# Patient Record
Sex: FEMALE | Race: BLACK OR AFRICAN AMERICAN | NOT HISPANIC OR LATINO | Employment: PART TIME | URBAN - METROPOLITAN AREA
[De-identification: names, ages, dates, MRNs, and addresses within clinical notes are randomized per-mention and may not be internally consistent; named-entity substitution may affect disease eponyms.]

---

## 2017-05-24 ENCOUNTER — HOSPITAL ENCOUNTER (EMERGENCY)
Facility: OTHER | Age: 37
Discharge: HOME | End: 2017-05-25
Attending: INTERNAL MEDICINE
Payer: MEDICAID

## 2017-05-24 DIAGNOSIS — R56.9 SEIZURES: Primary | ICD-10-CM

## 2017-05-24 LAB
B-HCG UR QL: NEGATIVE
CTP QC/QA: YES

## 2017-05-24 PROCEDURE — 25000003 PHARM REV CODE 250: Performed by: INTERNAL MEDICINE

## 2017-05-24 PROCEDURE — 81025 URINE PREGNANCY TEST: CPT | Performed by: INTERNAL MEDICINE

## 2017-05-24 PROCEDURE — 96374 THER/PROPH/DIAG INJ IV PUSH: CPT

## 2017-05-24 PROCEDURE — 85025 COMPLETE CBC W/AUTO DIFF WBC: CPT

## 2017-05-24 PROCEDURE — 80053 COMPREHEN METABOLIC PANEL: CPT

## 2017-05-24 PROCEDURE — 99284 EMERGENCY DEPT VISIT MOD MDM: CPT | Mod: 25

## 2017-05-24 PROCEDURE — 63600175 PHARM REV CODE 636 W HCPCS: Performed by: INTERNAL MEDICINE

## 2017-05-24 RX ADMIN — FOSPHENYTOIN SODIUM 825 MG PE: 50 INJECTION, SOLUTION INTRAMUSCULAR; INTRAVENOUS at 11:05

## 2017-05-25 VITALS
DIASTOLIC BLOOD PRESSURE: 78 MMHG | HEIGHT: 64 IN | WEIGHT: 182 LBS | OXYGEN SATURATION: 100 % | TEMPERATURE: 98 F | RESPIRATION RATE: 18 BRPM | SYSTOLIC BLOOD PRESSURE: 128 MMHG | BODY MASS INDEX: 31.07 KG/M2 | HEART RATE: 77 BPM

## 2017-05-25 LAB
ALBUMIN SERPL-MCNC: 3.2 G/DL (ref 3.3–5.5)
ALP SERPL-CCNC: 40 U/L (ref 42–141)
BILIRUB SERPL-MCNC: 0.4 MG/DL (ref 0.2–1.6)
BUN SERPL-MCNC: 10 MG/DL (ref 7–22)
CALCIUM SERPL-MCNC: 9.9 MG/DL (ref 8–10.3)
CHLORIDE SERPL-SCNC: 105 MMOL/L (ref 98–108)
CREAT SERPL-MCNC: 0.7 MG/DL (ref 0.6–1.2)
GLUCOSE SERPL-MCNC: 96 MG/DL (ref 73–118)
POC ALT (SGPT): 17 U/L (ref 10–47)
POC AST (SGOT): 20 U/L (ref 11–38)
POC TCO2: 26 MMOL/L (ref 18–33)
POTASSIUM BLD-SCNC: 3.9 MMOL/L (ref 3.6–5.1)
PROTEIN, POC: 7.2 G/DL (ref 6.4–8.1)
SODIUM BLD-SCNC: 133 MMOL/L (ref 128–145)

## 2017-05-25 RX ORDER — PHENYTOIN SODIUM 100 MG/1
300 CAPSULE, EXTENDED RELEASE ORAL DAILY
Qty: 100 CAPSULE | Refills: 0 | Status: SHIPPED | OUTPATIENT
Start: 2017-05-25 | End: 2018-05-25

## 2017-05-25 NOTE — ED NOTES
Patient identifiers verified and correct for Ashlee Flores.    LOC: The patient is awake, alert and aware of environment with an appropriate affect, the patient is oriented x 3 and speaking appropriately.  APPEARANCE: Patient resting comfortably and in no acute distress, patient is clean and well groomed, patient's clothing is properly fastened.  SKIN: The skin is warm and dry, color consistent with ethnicity, patient has normal skin turgor and moist mucus membranes, skin intact, no breakdown or bruising noted.  MUSCULOSKELETAL: Patient moving all extremities spontaneously, no obvious swelling or deformities noted.  RESPIRATORY: Airway is open and patent, respirations are spontaneous, patient has a normal effort and rate, no accessory muscle use noted, clear bilateral breath sounds.  CARDIAC: Patient has a normal rate and regular rhythm, no periphreal edema noted, capillary refill < 3 seconds.  ABDOMEN: Soft and non tender to palpation, no distention noted, normoactive bowel sounds present in all four quadrants.  NEUROLOGIC: PERRL, 3mm bilaterally, eyes open spontaneously, behavior appropriate to situation, follows commands, facial expression symmetrical, bilateral hand grasp equal and even, purposeful motor response noted, normal sensation in all extremities when touched with a finger.

## 2017-05-25 NOTE — ED PROVIDER NOTES
Encounter Date: 5/24/2017       History     Chief Complaint   Patient presents with    Seizures     Per pt's friend. Pt had seizure like activity lasting a few seconds approx 10 mins pta. Pt aaox 3 upon arrival.      Review of patient's allergies indicates:  No Known Allergies  37-year-old female presents to the emergency department after having a seizure at home today.  The seizure was witnessed by a family member who states it lasted about 10-20 seconds and consisted of generalized convulsions.  Patient states she has a history of a pituitary tumor which was resected in 2010.  She began to have seizures after the tumor grew, and after the tumor was resected her seizures discontinued and she was taken off her seizure medication, the name of which she doesn't recall      The history is provided by the patient. No  was used.   Neurologic Problem   The primary symptoms include seizures. Primary symptoms do not include headaches or dizziness. The symptoms began just prior to arrival. The episode lasted 20 seconds. The symptoms are resolved. The neurological symptoms are diffuse.   The onset of the seizure occurred less than 30 minutes ago. The seizure was witnessed. The event was not accompanied by urinary incontinence. Symptoms that were not associated with the seizure include headaches or myalgias. Medical problems do not include infection.   Additional symptoms do not include neck stiffness, weakness or photophobia. Medical issues also include seizures.     Past Medical History:   Diagnosis Date    Pituitary tumor      History reviewed. No pertinent surgical history.  History reviewed. No pertinent family history.  Social History   Substance Use Topics    Smoking status: Current Every Day Smoker    Smokeless tobacco: Not on file    Alcohol use Yes      Comment: social     Review of Systems   Constitutional: Negative.    HENT: Negative.    Eyes: Negative for photophobia.   Respiratory:  Negative.    Cardiovascular: Negative.    Gastrointestinal: Negative.    Endocrine: Negative.    Musculoskeletal: Negative for myalgias and neck stiffness.   Skin: Negative.    Neurological: Positive for seizures. Negative for dizziness, tremors, facial asymmetry, speech difficulty, weakness, light-headedness, numbness and headaches.   Psychiatric/Behavioral: Negative.        Physical Exam     Initial Vitals [05/24/17 2143]   BP Pulse Resp Temp SpO2   131/70 98 20 98.1 °F (36.7 °C) 96 %     Physical Exam    Nursing note and vitals reviewed.  Constitutional: She appears well-developed and well-nourished.   HENT:   Head: Normocephalic and atraumatic.   Right Ear: External ear normal.   Left Ear: External ear normal.   Nose: Nose normal.   Mouth/Throat: Oropharynx is clear and moist.   Eyes: Conjunctivae and EOM are normal. Pupils are equal, round, and reactive to light.   Neck: Normal range of motion. Neck supple.   Cardiovascular: Normal rate and regular rhythm.   Pulmonary/Chest: Breath sounds normal. No respiratory distress.   Abdominal: Soft. She exhibits no distension.   Musculoskeletal: Normal range of motion.   Neurological: She is alert and oriented to person, place, and time. No cranial nerve deficit.   Skin: Skin is warm and dry. Capillary refill takes less than 2 seconds.   Psychiatric: She has a normal mood and affect. Her behavior is normal. Thought content normal.         ED Course   Procedures  Labs Reviewed   POCT CMP - Abnormal; Notable for the following:        Result Value    Albumin, POC 3.2 (*)     Alkaline Phosphatase, POC 40 (*)     All other components within normal limits   POCT CBC   POCT URINE PREGNANCY   POCT CMP             Medical Decision Making:   Initial Assessment:   37-year-old female presents to the emergency department after having a seizure at home today.  The seizure was witnessed by a family member who states it lasted about 10-20 seconds and consisted of generalized  convulsions.  Patient states she has a history of a pituitary tumor which was resected in 2010.  She began to have seizures after the tumor grew, and after the tumor was resected her seizures discontinued and she was taken off her seizure medication, the name of which she doesn't recall    Differential Diagnosis:   Seizures  Syncopal episode  Clinical Tests:   Lab Tests: Ordered and Reviewed  Radiological Study: Ordered and Reviewed  ED Management:  Evaluation revealed no definite acute reason for recurrence of seizures.  CT of the head showed no acute changes.  Patient was advised to follow up with her neurologist tomorrow for further evaluation and treatment.  She was given an infusion of Cerebyx in the emergency department and a prescription for Dilantin.        Labs Reviewed  Admission on 05/24/2017, Discharged on 05/25/2017   Component Date Value Ref Range Status    POC Preg Test, Ur 05/24/2017 Negative  Negative Final     Acceptable 05/24/2017 Yes   Final    Albumin, POC 05/25/2017 3.2* 3.3 - 5.5 g/dL Final    Alkaline Phosphatase, POC 05/25/2017 40* 42 - 141 U/L Final    ALT (SGPT), POC 05/25/2017 17  10 - 47 U/L Final    AST (SGOT), POC 05/25/2017 20  11 - 38 U/L Final    POC BUN 05/25/2017 10  7 - 22 mg/dL Final    Calcium, POC 05/25/2017 9.9  8.0 - 10.3 mg/dL Final    POC Chloride 05/25/2017 105  98 - 108 mmol/L Final    POC Creatinine 05/25/2017 0.7  0.6 - 1.2 mg/dL Final    POC Glucose 05/25/2017 96  73 - 118 mg/dL Final    POC Potassium 05/25/2017 3.9  3.6 - 5.1 mmol/L Final    POC Sodium 05/25/2017 133  128 - 145 mmol/L Final    Bilirubin 05/25/2017 0.4  0.2 - 1.6 mg/dL Final    POC TCO2 05/25/2017 26  18 - 33 mmol/L Final    Protein 05/25/2017 7.2  6.4 - 8.1 g/dL Final        Imaging Reviewed    Imaging Results          CT Head Without Contrast (Final result)  Result time 05/24/17 23:42:40    Final result by Colt Yan DO (05/24/17 23:42:40)                  Narrative:    Study Desc:   CT HEAD WITHOUT CONTRAST  Clinical History: Seizure, history of pituitary tumor removal.  COMPARISON: None.     Technique: CT of the brain is performed with a multi-detector CT. Coronal and sagittal   reconstructions were obtained.     The total exam DLP is 646.25 mGy-cm.     FINDINGS:  The brain is structurally normal without focal lesion, edema, hemorrhage, mass, or mass   effect.  The ventricles are normal in size and configuration.  The basal cisterns are   normal.  Prominent CSF space in the sella which suggest partial empty sella but could be   postsurgical nature.  The anterior inferior sella turcica appears markedly thinned.    Comparison to prior imaging or follow-up imaging may have merit.  The skull, orbits, paranasal sinuses, and mastoid air cells are otherwise normal.     If symptoms or clinical suspicion persists or get worse or suspicion for acute or   subacute infarct, correlation with MRI with diffusion weighted sequence may have merit.     IMPRESSION:  Prominent CSF space in the sella which suggest partial empty sella but could be   postsurgical nature.  The anterior inferior sella turcica appears markedly thinned.    Comparison to prior imaging or follow-up imaging may have merit.  Normal noncontrast CT examination of the brain.  There is no stroke, hemorrhage, or mass.     SL:24  Signed by: JOHANA Yan DO  2017-05-24 23:42:38 [CDT]                              Medications given in ED    Medications   fosphenytoin (CEREBYX) 825 mg PE in dextrose 5 % 50 mL IVPB (825 mg PE Intravenous Given 5/24/17 2304)       Discharge Medications     Discharge Medication List as of 5/25/2017 12:07 AM      START taking these medications    Details   phenytoin (DILANTIN) 100 MG ER capsule Take 3 capsules (300 mg total) by mouth once daily., Starting Thu 5/25/2017, Until Fri 5/25/2018, Print                   Patient discharged to home in stable condition with instructions to:   1.  Please take all meds as prescribed.  2. Follow-up with your primary care doctor   3. Return precautions discussed and patient and/or family/caretaker understands to return to the emergency room for any concerns including worsening of your current symptoms, fever, chills, night sweats, worsening pain, chest pain, shortness of breath, nausea, vomiting, diarrhea, bleeding, headache, difficulty talking, visual disturbances, weakness, numbness or any other acute concerns                ED Course     Clinical Impression:   The primary diagnosis is seizure disorder  Disposition:   Disposition: Discharged  Condition: Stable       Nato Jones MD  05/25/17 0202

## 2017-05-25 NOTE — ED TRIAGE NOTES
Pt was standing outside smoking and passed out for like 10 secs. Per bystander pt went out again but was convulsing and came around after a few minutes. Pt states she had pituitary tumor diagnosed in 2010 with surgery in 2011 and has not had any seizures since. Pt aaox3 upon arrival. + loc. Pt has no other complaints

## 2017-06-21 ENCOUNTER — HOSPITAL ENCOUNTER (EMERGENCY)
Facility: OTHER | Age: 37
Discharge: HOME | End: 2017-06-21
Attending: EMERGENCY MEDICINE
Payer: MEDICAID

## 2017-06-21 VITALS
DIASTOLIC BLOOD PRESSURE: 83 MMHG | TEMPERATURE: 98 F | RESPIRATION RATE: 16 BRPM | HEART RATE: 82 BPM | SYSTOLIC BLOOD PRESSURE: 123 MMHG | OXYGEN SATURATION: 100 %

## 2017-06-21 DIAGNOSIS — M72.2 PLANTAR FASCIITIS: Primary | ICD-10-CM

## 2017-06-21 DIAGNOSIS — M79.671 RIGHT FOOT PAIN: ICD-10-CM

## 2017-06-21 PROCEDURE — 99283 EMERGENCY DEPT VISIT LOW MDM: CPT

## 2017-06-21 PROCEDURE — 25000003 PHARM REV CODE 250: Performed by: EMERGENCY MEDICINE

## 2017-06-21 RX ORDER — IBUPROFEN 400 MG/1
800 TABLET ORAL
Status: COMPLETED | OUTPATIENT
Start: 2017-06-21 | End: 2017-06-21

## 2017-06-21 RX ORDER — INDOMETHACIN 25 MG/1
50 CAPSULE ORAL
Qty: 30 CAPSULE | Refills: 0 | Status: SHIPPED | OUTPATIENT
Start: 2017-06-21

## 2017-06-21 RX ADMIN — IBUPROFEN 800 MG: 400 TABLET, FILM COATED ORAL at 08:06

## 2017-06-22 NOTE — ED PROVIDER NOTES
Encounter Date: 6/21/2017       History     Chief Complaint   Patient presents with    Foot Pain     pt c/o R foot pain x 3 days     37-year-old who complains of pain to the bottom of her foot.  Patient awoke 3 days ago with the pain.  She reports  throbbing pain when she walks or when she holds her foot down.    She did wear high heels 5  days ago.  She denies trauma.  She has not taken anything for the pain.            Review of patient's allergies indicates:  No Known Allergies  Past Medical History:   Diagnosis Date    Pituitary tumor     Seizures      Past Surgical History:   Procedure Laterality Date    TUMOR REMOVAL       History reviewed. No pertinent family history.  Social History   Substance Use Topics    Smoking status: Current Every Day Smoker    Smokeless tobacco: Not on file    Alcohol use Yes      Comment: social     Review of Systems   Constitutional: Negative for fever.   Musculoskeletal: Positive for gait problem.        Right foot pain   Skin: Negative for color change.   Neurological: Negative for weakness and numbness.       Physical Exam     Initial Vitals [06/21/17 1924]   BP Pulse Resp Temp SpO2   -- -- -- 98.2 °F (36.8 °C) --     Physical Exam    Constitutional: No distress.   Cardiovascular:   Pulses:       Dorsalis pedis pulses are 3+ on the right side.   Musculoskeletal: She exhibits no edema.   Mild tenderness only with deep palpation of the plantar surface of the right foot, medial aspect   Neurological: She is alert and oriented to person, place, and time. She has normal strength. No sensory deficit.   Skin: No erythema.         ED Course   Procedures  Labs Reviewed - No data to display          Medical Decision Making:   Initial Assessment:   37 yr old complains of pain to the plantar surface of her right foot.  No trauma.  Patient has no tenderness except with deep palpation  ED Management:  X-ray will be done to assess for calcaneal spur.  Patient will be treated with  ibuprofen.  There are no acute findings on x-ray.  Patient will be referred to podiatry and started on indomethacin.                   ED Course     Clinical Impression:   The primary encounter diagnosis was Plantar fasciitis. A diagnosis of Right foot pain was also pertinent to this visit.                           Jessy Rosenbaum MD  06/21/17 2365

## 2017-06-22 NOTE — ED NOTES
D/c'd with NADN: no complaints voiced; denies any needs; d/c education performed; pt stated understanding; steady gait OOED

## 2018-12-29 ENCOUNTER — HOSPITAL ENCOUNTER (INPATIENT)
Facility: OTHER | Age: 38
LOS: 4 days | Discharge: HOME | DRG: 863 | End: 2019-01-02
Attending: EMERGENCY MEDICINE | Admitting: SPECIALIST
Payer: MEDICAID

## 2018-12-29 DIAGNOSIS — R19.07 GENERALIZED SWELLING, MASS, OR LUMP OF ABDOMEN OR PELVIS: ICD-10-CM

## 2018-12-29 DIAGNOSIS — D64.9 ANEMIA, UNSPECIFIED TYPE: ICD-10-CM

## 2018-12-29 DIAGNOSIS — M60.009 MUSCLE ABSCESS: Primary | ICD-10-CM

## 2018-12-29 LAB
ALBUMIN SERPL BCP-MCNC: 2.5 G/DL
ALP SERPL-CCNC: 68 U/L
ALT SERPL W/O P-5'-P-CCNC: 14 U/L
ANION GAP SERPL CALC-SCNC: 9 MMOL/L
AST SERPL-CCNC: 13 U/L
B-HCG UR QL: NEGATIVE
BASOPHILS # BLD AUTO: 0.01 K/UL
BASOPHILS NFR BLD: 0.1 %
BILIRUB SERPL-MCNC: 0.2 MG/DL
BUN SERPL-MCNC: 5 MG/DL
CALCIUM SERPL-MCNC: 10.4 MG/DL
CHLORIDE SERPL-SCNC: 107 MMOL/L
CO2 SERPL-SCNC: 25 MMOL/L
CREAT SERPL-MCNC: 0.9 MG/DL
CTP QC/QA: YES
DIFFERENTIAL METHOD: ABNORMAL
EOSINOPHIL # BLD AUTO: 0.3 K/UL
EOSINOPHIL NFR BLD: 4.5 %
ERYTHROCYTE [DISTWIDTH] IN BLOOD BY AUTOMATED COUNT: 16.1 %
EST. GFR  (AFRICAN AMERICAN): >60 ML/MIN/1.73 M^2
EST. GFR  (NON AFRICAN AMERICAN): >60 ML/MIN/1.73 M^2
GLUCOSE SERPL-MCNC: 93 MG/DL
HCT VFR BLD AUTO: 26.4 %
HGB BLD-MCNC: 8.1 G/DL
LACTATE SERPL-SCNC: 2.8 MMOL/L
LYMPHOCYTES # BLD AUTO: 1.7 K/UL
LYMPHOCYTES NFR BLD: 22.6 %
MCH RBC QN AUTO: 26 PG
MCHC RBC AUTO-ENTMCNC: 30.7 G/DL
MCV RBC AUTO: 85 FL
MONOCYTES # BLD AUTO: 0.4 K/UL
MONOCYTES NFR BLD: 5.8 %
NEUTROPHILS # BLD AUTO: 4.8 K/UL
NEUTROPHILS NFR BLD: 66 %
PLATELET # BLD AUTO: 627 K/UL
PMV BLD AUTO: 8.8 FL
POTASSIUM SERPL-SCNC: 3.9 MMOL/L
PROT SERPL-MCNC: 7.7 G/DL
RBC # BLD AUTO: 3.11 M/UL
SODIUM SERPL-SCNC: 141 MMOL/L
WBC # BLD AUTO: 7.3 K/UL

## 2018-12-29 PROCEDURE — 83605 ASSAY OF LACTIC ACID: CPT

## 2018-12-29 PROCEDURE — 99285 EMERGENCY DEPT VISIT HI MDM: CPT | Mod: 25

## 2018-12-29 PROCEDURE — 80053 COMPREHEN METABOLIC PANEL: CPT

## 2018-12-29 PROCEDURE — 96365 THER/PROPH/DIAG IV INF INIT: CPT

## 2018-12-29 PROCEDURE — 25500020 PHARM REV CODE 255: Performed by: EMERGENCY MEDICINE

## 2018-12-29 PROCEDURE — 11000001 HC ACUTE MED/SURG PRIVATE ROOM

## 2018-12-29 PROCEDURE — 87040 BLOOD CULTURE FOR BACTERIA: CPT

## 2018-12-29 PROCEDURE — 25000003 PHARM REV CODE 250: Performed by: PHYSICIAN ASSISTANT

## 2018-12-29 PROCEDURE — 81025 URINE PREGNANCY TEST: CPT | Performed by: EMERGENCY MEDICINE

## 2018-12-29 PROCEDURE — 96361 HYDRATE IV INFUSION ADD-ON: CPT

## 2018-12-29 PROCEDURE — 96375 TX/PRO/DX INJ NEW DRUG ADDON: CPT

## 2018-12-29 PROCEDURE — 63600175 PHARM REV CODE 636 W HCPCS: Performed by: PHYSICIAN ASSISTANT

## 2018-12-29 PROCEDURE — 94761 N-INVAS EAR/PLS OXIMETRY MLT: CPT

## 2018-12-29 PROCEDURE — 85025 COMPLETE CBC W/AUTO DIFF WBC: CPT

## 2018-12-29 RX ORDER — MORPHINE SULFATE 4 MG/ML
4 INJECTION, SOLUTION INTRAMUSCULAR; INTRAVENOUS EVERY 4 HOURS PRN
Status: DISCONTINUED | OUTPATIENT
Start: 2018-12-29 | End: 2019-01-02 | Stop reason: HOSPADM

## 2018-12-29 RX ORDER — HYDROCODONE BITARTRATE AND ACETAMINOPHEN 5; 325 MG/1; MG/1
1 TABLET ORAL
Status: COMPLETED | OUTPATIENT
Start: 2018-12-29 | End: 2018-12-29

## 2018-12-29 RX ORDER — LIDOCAINE HYDROCHLORIDE 20 MG/ML
5 INJECTION, SOLUTION INFILTRATION; PERINEURAL
Status: COMPLETED | OUTPATIENT
Start: 2018-12-29 | End: 2018-12-29

## 2018-12-29 RX ORDER — ONDANSETRON 2 MG/ML
4 INJECTION INTRAMUSCULAR; INTRAVENOUS EVERY 8 HOURS PRN
Status: DISCONTINUED | OUTPATIENT
Start: 2018-12-29 | End: 2019-01-02 | Stop reason: HOSPADM

## 2018-12-29 RX ORDER — SODIUM CHLORIDE 9 MG/ML
INJECTION, SOLUTION INTRAVENOUS CONTINUOUS
Status: DISCONTINUED | OUTPATIENT
Start: 2018-12-29 | End: 2018-12-31

## 2018-12-29 RX ADMIN — MORPHINE SULFATE 4 MG: 4 INJECTION INTRAVENOUS at 03:12

## 2018-12-29 RX ADMIN — PIPERACILLIN AND TAZOBACTAM 4.5 G: 4; .5 INJECTION, POWDER, LYOPHILIZED, FOR SOLUTION INTRAVENOUS; PARENTERAL at 12:12

## 2018-12-29 RX ADMIN — VANCOMYCIN HYDROCHLORIDE 2000 MG: 10 INJECTION, POWDER, LYOPHILIZED, FOR SOLUTION INTRAVENOUS at 01:12

## 2018-12-29 RX ADMIN — MORPHINE SULFATE 4 MG: 4 INJECTION INTRAVENOUS at 08:12

## 2018-12-29 RX ADMIN — SODIUM CHLORIDE: 0.9 INJECTION, SOLUTION INTRAVENOUS at 05:12

## 2018-12-29 RX ADMIN — ONDANSETRON 4 MG: 2 INJECTION INTRAMUSCULAR; INTRAVENOUS at 03:12

## 2018-12-29 RX ADMIN — IOHEXOL 100 ML: 350 INJECTION, SOLUTION INTRAVENOUS at 02:12

## 2018-12-29 RX ADMIN — LIDOCAINE HYDROCHLORIDE 5 ML: 20 INJECTION, SOLUTION INFILTRATION; PERINEURAL at 12:12

## 2018-12-29 RX ADMIN — HYDROCODONE BITARTRATE AND ACETAMINOPHEN 1 TABLET: 5; 325 TABLET ORAL at 12:12

## 2018-12-29 RX ADMIN — SODIUM CHLORIDE 1000 ML: 0.9 INJECTION, SOLUTION INTRAVENOUS at 12:12

## 2018-12-29 NOTE — ED PROVIDER NOTES
Encounter Date: 12/29/2018       History     Chief Complaint   Patient presents with    Abscess     Reports abscess to right buttocks x 1 week     Patient is a 38-year-old female with history of seizures who presents to the emergency department with right-sided buttocks pain.  Patient states on December 6 she had liposuction transfer in [place] by plastic surgeon.  She states she was discharged a couple of days after the procedure and came back to [place].  She states she was on prophylactic antibiotics for 5 days.  She states she was doing well until about 11 days ago when she developed swelling and pain to the right buttocks.  She states she sent pictures to her surgeon who called her in antibiotics.  She states she was on antibiotics for 10 days and took her last dose yesterday.  She reports body aches and chills.  She reports 10/10 pain to the right buttocks.  She denies any drainage. She denies diabetes or immunocompromised state.      The history is provided by the patient.     Review of patient's allergies indicates:  No Known Allergies  Past Medical History:   Diagnosis Date    Pituitary tumor     Seizures      Past Surgical History:   Procedure Laterality Date    TUMOR REMOVAL       No family history on file.  Social History     Tobacco Use    Smoking status: Current Every Day Smoker   Substance Use Topics    Alcohol use: Yes     Comment: social    Drug use: Not on file     Review of Systems   Constitutional: Positive for chills. Negative for activity change, appetite change, fatigue and fever.   HENT: Negative for congestion, ear discharge, ear pain, postnasal drip, rhinorrhea, sore throat and trouble swallowing.    Respiratory: Negative for cough and shortness of breath.    Cardiovascular: Negative for chest pain.   Gastrointestinal: Negative for abdominal pain, blood in stool, constipation, diarrhea, nausea and vomiting.   Genitourinary: Negative for dysuria, flank pain and hematuria.    Musculoskeletal: Positive for myalgias. Negative for back pain, neck pain and neck stiffness.   Skin: Positive for wound. Negative for rash.   Neurological: Negative for dizziness, weakness, light-headedness, numbness and headaches.       Physical Exam     Initial Vitals [12/29/18 1102]   BP Pulse Resp Temp SpO2   119/70 96 18 98 °F (36.7 °C) 100 %      MAP       --         Physical Exam    Nursing note and vitals reviewed.  Constitutional: She appears well-developed and well-nourished. She is not diaphoretic.  Non-toxic appearance. No distress.   HENT:   Head: Normocephalic.   Right Ear: Hearing and external ear normal.   Left Ear: Hearing and external ear normal.   Nose: Nose normal.   Mouth/Throat: Uvula is midline, oropharynx is clear and moist and mucous membranes are normal. No oropharyngeal exudate.   Eyes: Conjunctivae are normal. Pupils are equal, round, and reactive to light.   Neck: Normal range of motion.   Cardiovascular: Regular rhythm.   Mildly tachycardic on exam   Pulmonary/Chest: Breath sounds normal.   Abdominal: Soft. Bowel sounds are normal. There is no tenderness.   Lymphadenopathy:     She has no cervical adenopathy.   Neurological: She is alert and oriented to person, place, and time. She has normal strength.   Skin: Skin is warm and dry. Capillary refill takes less than 2 seconds.        Psychiatric: She has a normal mood and affect.         ED Course   Procedures  Labs Reviewed   CULTURE, BLOOD   CULTURE, BLOOD   CBC W/ AUTO DIFFERENTIAL   COMPREHENSIVE METABOLIC PANEL   LACTIC ACID, PLASMA   POCT URINE PREGNANCY          Imaging Results    None          Medical Decision Making:   Initial Assessment:   Urgent evaluation of a 38-year-old female with history of seizures and recent plastic surgery procedure who presents to the emergency department with right buttocks pain. Patient is afebrile, nontoxic appearing, and hemodynamically stable.  Patient currently does not meet SIRS criteria.  On  exam, there is extensive induration and erythema to the right buttocks.  There is warmth to touch.  There is tenderness to palpation.  There is underlying soft tissue infection.  I am unsure of the extent of infection.  Lab work will be obtained.  Patient has failed outpatient treatment with 2 antibiotics.  She will be admitted for IV antibiotics.  Patient will be started on vanc and Zosyn.  Clinical Tests:   Lab Tests: Ordered and Reviewed  ED Management:  3:22 PM  Anemia is present which is not much lower than her last H&H in the chart back in 2012.  I suspect this is baseline for patient.  No leukocytosis.  No bandemia.  Lactic is slightly elevated at 2.8, but no other signs of sepsis.  CT scan shows large intramuscular abscess.  Case is discussed with general surgery who will admit patient and take her to surgery tomorrow.  Other:   I have discussed this case with another health care provider.       <> Summary of the Discussion: Dr. Batista                      Clinical Impression:   The primary encounter diagnosis was Muscle abscess. A diagnosis of Anemia, unspecified type was also pertinent to this visit.                             Laurel Moran PA-C  12/29/18 1526

## 2018-12-29 NOTE — ED NOTES
Pt updated on plan for surgery tomorrow. Alireza CHARLTON discussed surgery with Dr. Sherman. Pt requesting something to eat

## 2018-12-29 NOTE — ED NOTES
Pt presents with c/o an abscess for a week on the lower buttocks. Pt reports clear drainage. Pt is AAOx4. RR are even and unlabored. Skin is warm and dry. Pt is ambulatory with a steady gait.

## 2018-12-29 NOTE — ED NOTES
Alireza CHARLTON at bedside discussing plan of care. Pt remains lying on abd due to pain to right buttocks. Pt appears in NAD with even, unlabored respirations.

## 2018-12-29 NOTE — NURSING
Pt arrived to floor via stretcher with transport tech and transferred to bed. IVF started, oriented to room, call light placed within reach, bed low and locked, and family at bedside. Patient is unable to lie supine due to the location of the abscess, she is lying prone in bed. She is able to walk independently and has a steady gait.  Pt complains of pain 2/10. Gauze with paper tape noted to right buttock, small amount of serous drainage noted. Dr. Sherman paged to inform of patient's arrival to floor. No acute distress noted at this time. Will continue to monitor.

## 2018-12-30 ENCOUNTER — ANESTHESIA EVENT (OUTPATIENT)
Dept: SURGERY | Facility: OTHER | Age: 38
DRG: 863 | End: 2018-12-30
Payer: MEDICAID

## 2018-12-30 ENCOUNTER — ANESTHESIA (OUTPATIENT)
Dept: SURGERY | Facility: OTHER | Age: 38
DRG: 863 | End: 2018-12-30
Payer: MEDICAID

## 2018-12-30 LAB
GRAM STN SPEC: NORMAL
GRAM STN SPEC: NORMAL

## 2018-12-30 PROCEDURE — 11000001 HC ACUTE MED/SURG PRIVATE ROOM

## 2018-12-30 PROCEDURE — 87070 CULTURE OTHR SPECIMN AEROBIC: CPT

## 2018-12-30 PROCEDURE — 87116 MYCOBACTERIA CULTURE: CPT

## 2018-12-30 PROCEDURE — 25000003 PHARM REV CODE 250: Performed by: NURSE ANESTHETIST, CERTIFIED REGISTERED

## 2018-12-30 PROCEDURE — 25000003 PHARM REV CODE 250: Performed by: ANESTHESIOLOGY

## 2018-12-30 PROCEDURE — 71000033 HC RECOVERY, INTIAL HOUR: Performed by: SPECIALIST

## 2018-12-30 PROCEDURE — 63600175 PHARM REV CODE 636 W HCPCS: Performed by: ANESTHESIOLOGY

## 2018-12-30 PROCEDURE — 36000704 HC OR TIME LEV I 1ST 15 MIN: Performed by: SPECIALIST

## 2018-12-30 PROCEDURE — 87075 CULTR BACTERIA EXCEPT BLOOD: CPT

## 2018-12-30 PROCEDURE — 87102 FUNGUS ISOLATION CULTURE: CPT

## 2018-12-30 PROCEDURE — 25000003 PHARM REV CODE 250: Performed by: PHYSICIAN ASSISTANT

## 2018-12-30 PROCEDURE — 94761 N-INVAS EAR/PLS OXIMETRY MLT: CPT

## 2018-12-30 PROCEDURE — 37000008 HC ANESTHESIA 1ST 15 MINUTES: Performed by: SPECIALIST

## 2018-12-30 PROCEDURE — 87206 SMEAR FLUORESCENT/ACID STAI: CPT

## 2018-12-30 PROCEDURE — 37000009 HC ANESTHESIA EA ADD 15 MINS: Performed by: SPECIALIST

## 2018-12-30 PROCEDURE — 71000039 HC RECOVERY, EACH ADD'L HOUR: Performed by: SPECIALIST

## 2018-12-30 PROCEDURE — 63600175 PHARM REV CODE 636 W HCPCS: Performed by: NURSE ANESTHETIST, CERTIFIED REGISTERED

## 2018-12-30 PROCEDURE — 36000705 HC OR TIME LEV I EA ADD 15 MIN: Performed by: SPECIALIST

## 2018-12-30 PROCEDURE — 87205 SMEAR GRAM STAIN: CPT

## 2018-12-30 RX ORDER — ACETAMINOPHEN 10 MG/ML
INJECTION, SOLUTION INTRAVENOUS
Status: DISCONTINUED | OUTPATIENT
Start: 2018-12-30 | End: 2018-12-30

## 2018-12-30 RX ORDER — ROCURONIUM BROMIDE 10 MG/ML
INJECTION, SOLUTION INTRAVENOUS
Status: DISCONTINUED | OUTPATIENT
Start: 2018-12-30 | End: 2018-12-30

## 2018-12-30 RX ORDER — GLYCOPYRROLATE 0.2 MG/ML
INJECTION INTRAMUSCULAR; INTRAVENOUS
Status: DISCONTINUED | OUTPATIENT
Start: 2018-12-30 | End: 2018-12-30

## 2018-12-30 RX ORDER — MIDAZOLAM HYDROCHLORIDE 1 MG/ML
INJECTION INTRAMUSCULAR; INTRAVENOUS
Status: DISCONTINUED | OUTPATIENT
Start: 2018-12-30 | End: 2018-12-30

## 2018-12-30 RX ORDER — SODIUM CHLORIDE, SODIUM LACTATE, POTASSIUM CHLORIDE, CALCIUM CHLORIDE 600; 310; 30; 20 MG/100ML; MG/100ML; MG/100ML; MG/100ML
INJECTION, SOLUTION INTRAVENOUS CONTINUOUS PRN
Status: DISCONTINUED | OUTPATIENT
Start: 2018-12-30 | End: 2018-12-30

## 2018-12-30 RX ORDER — OXYCODONE HYDROCHLORIDE 5 MG/1
5 TABLET ORAL
Status: DISCONTINUED | OUTPATIENT
Start: 2018-12-30 | End: 2019-01-02 | Stop reason: HOSPADM

## 2018-12-30 RX ORDER — DEXAMETHASONE SODIUM PHOSPHATE 4 MG/ML
INJECTION, SOLUTION INTRA-ARTICULAR; INTRALESIONAL; INTRAMUSCULAR; INTRAVENOUS; SOFT TISSUE
Status: DISCONTINUED | OUTPATIENT
Start: 2018-12-30 | End: 2018-12-30

## 2018-12-30 RX ORDER — NEOSTIGMINE METHYLSULFATE 1 MG/ML
INJECTION, SOLUTION INTRAVENOUS
Status: DISCONTINUED | OUTPATIENT
Start: 2018-12-30 | End: 2018-12-30

## 2018-12-30 RX ORDER — ONDANSETRON HYDROCHLORIDE 2 MG/ML
INJECTION, SOLUTION INTRAMUSCULAR; INTRAVENOUS
Status: DISCONTINUED | OUTPATIENT
Start: 2018-12-30 | End: 2018-12-30

## 2018-12-30 RX ORDER — ONDANSETRON 2 MG/ML
4 INJECTION INTRAMUSCULAR; INTRAVENOUS DAILY PRN
Status: COMPLETED | OUTPATIENT
Start: 2018-12-30 | End: 2018-12-31

## 2018-12-30 RX ORDER — LIDOCAINE HCL/PF 100 MG/5ML
SYRINGE (ML) INTRAVENOUS
Status: DISCONTINUED | OUTPATIENT
Start: 2018-12-30 | End: 2018-12-30

## 2018-12-30 RX ORDER — FENTANYL CITRATE 50 UG/ML
25 INJECTION, SOLUTION INTRAMUSCULAR; INTRAVENOUS EVERY 5 MIN PRN
Status: DISCONTINUED | OUTPATIENT
Start: 2018-12-30 | End: 2019-01-02 | Stop reason: HOSPADM

## 2018-12-30 RX ORDER — SODIUM CHLORIDE 0.9 % (FLUSH) 0.9 %
3 SYRINGE (ML) INJECTION
Status: DISCONTINUED | OUTPATIENT
Start: 2018-12-30 | End: 2019-01-02 | Stop reason: HOSPADM

## 2018-12-30 RX ORDER — KETOROLAC TROMETHAMINE 30 MG/ML
INJECTION, SOLUTION INTRAMUSCULAR; INTRAVENOUS
Status: DISCONTINUED | OUTPATIENT
Start: 2018-12-30 | End: 2018-12-30

## 2018-12-30 RX ORDER — HYDROMORPHONE HYDROCHLORIDE 2 MG/ML
0.4 INJECTION, SOLUTION INTRAMUSCULAR; INTRAVENOUS; SUBCUTANEOUS EVERY 5 MIN PRN
Status: DISCONTINUED | OUTPATIENT
Start: 2018-12-30 | End: 2019-01-02 | Stop reason: HOSPADM

## 2018-12-30 RX ORDER — FENTANYL CITRATE 50 UG/ML
INJECTION, SOLUTION INTRAMUSCULAR; INTRAVENOUS
Status: DISCONTINUED | OUTPATIENT
Start: 2018-12-30 | End: 2018-12-30

## 2018-12-30 RX ORDER — PROPOFOL 10 MG/ML
VIAL (ML) INTRAVENOUS
Status: DISCONTINUED | OUTPATIENT
Start: 2018-12-30 | End: 2018-12-30

## 2018-12-30 RX ORDER — MEPERIDINE HYDROCHLORIDE 50 MG/ML
12.5 INJECTION INTRAMUSCULAR; INTRAVENOUS; SUBCUTANEOUS ONCE AS NEEDED
Status: ACTIVE | OUTPATIENT
Start: 2018-12-30 | End: 2018-12-31

## 2018-12-30 RX ADMIN — CARBOXYMETHYLCELLULOSE SODIUM 2 DROP: 2.5 SOLUTION/ DROPS OPHTHALMIC at 10:12

## 2018-12-30 RX ADMIN — ROCURONIUM BROMIDE 35 MG: 10 INJECTION, SOLUTION INTRAVENOUS at 10:12

## 2018-12-30 RX ADMIN — ACETAMINOPHEN 1000 MG: 10 INJECTION, SOLUTION INTRAVENOUS at 10:12

## 2018-12-30 RX ADMIN — MIDAZOLAM HYDROCHLORIDE 2 MG: 1 INJECTION, SOLUTION INTRAMUSCULAR; INTRAVENOUS at 10:12

## 2018-12-30 RX ADMIN — PROPOFOL 150 MG: 10 INJECTION, EMULSION INTRAVENOUS at 10:12

## 2018-12-30 RX ADMIN — HYDROMORPHONE HYDROCHLORIDE 0.4 MG: 2 INJECTION INTRAMUSCULAR; INTRAVENOUS; SUBCUTANEOUS at 10:12

## 2018-12-30 RX ADMIN — GLYCOPYRROLATE 0.8 MG: 0.2 INJECTION, SOLUTION INTRAMUSCULAR; INTRAVENOUS at 10:12

## 2018-12-30 RX ADMIN — LIDOCAINE HYDROCHLORIDE 80 MG: 20 INJECTION, SOLUTION INTRAVENOUS at 10:12

## 2018-12-30 RX ADMIN — FENTANYL CITRATE 100 MCG: 50 INJECTION, SOLUTION INTRAMUSCULAR; INTRAVENOUS at 10:12

## 2018-12-30 RX ADMIN — KETOROLAC TROMETHAMINE 30 MG: 30 INJECTION, SOLUTION INTRAMUSCULAR; INTRAVENOUS at 10:12

## 2018-12-30 RX ADMIN — DEXAMETHASONE SODIUM PHOSPHATE 8 MG: 4 INJECTION, SOLUTION INTRAMUSCULAR; INTRAVENOUS at 10:12

## 2018-12-30 RX ADMIN — NEOSTIGMINE METHYLSULFATE 5 MG: 1 INJECTION INTRAVENOUS at 10:12

## 2018-12-30 RX ADMIN — ONDANSETRON 4 MG: 2 INJECTION, SOLUTION INTRAMUSCULAR; INTRAVENOUS at 10:12

## 2018-12-30 RX ADMIN — ONDANSETRON 4 MG: 2 INJECTION INTRAMUSCULAR; INTRAVENOUS at 04:12

## 2018-12-30 RX ADMIN — HYDROMORPHONE HYDROCHLORIDE 0.4 MG: 2 INJECTION INTRAMUSCULAR; INTRAVENOUS; SUBCUTANEOUS at 11:12

## 2018-12-30 RX ADMIN — SODIUM CHLORIDE: 0.9 INJECTION, SOLUTION INTRAVENOUS at 12:12

## 2018-12-30 RX ADMIN — GLYCOPYRROLATE 0.2 MG: 0.2 INJECTION, SOLUTION INTRAMUSCULAR; INTRAVENOUS at 10:12

## 2018-12-30 RX ADMIN — OXYCODONE HYDROCHLORIDE 5 MG: 5 TABLET ORAL at 10:12

## 2018-12-30 RX ADMIN — SODIUM CHLORIDE, SODIUM LACTATE, POTASSIUM CHLORIDE, AND CALCIUM CHLORIDE: .6; .31; .03; .02 INJECTION, SOLUTION INTRAVENOUS at 10:12

## 2018-12-30 RX ADMIN — OXYCODONE HYDROCHLORIDE 5 MG: 5 TABLET ORAL at 11:12

## 2018-12-30 NOTE — ANESTHESIA PREPROCEDURE EVALUATION
12/30/2018  Ashlee Flores is a 38 y.o., female.    Anesthesia Evaluation    I have reviewed the Patient Summary Reports.    I have reviewed the Nursing Notes.   I have reviewed the Medications.     Review of Systems  Anesthesia Hx:  No problems with previous Anesthesia  Denies Family Hx of Anesthesia complications.   Denies Personal Hx of Anesthesia complications.   Social:  Smoker    Hematology/Oncology:     Oncology Normal    -- Anemia:   EENT/Dental:EENT/Dental Normal   Cardiovascular:  Cardiovascular Normal     Pulmonary:  Pulmonary Normal    Renal/:  Renal/ Normal     Hepatic/GI:  Hepatic/GI Normal    Musculoskeletal:  Musculoskeletal Normal    Neurological:   Neuromuscular Disease, Seizures    Endocrine:  Endocrine Normal History of pituitary tumor   Dermatological:  Skin Normal    Psych:  Psychiatric Normal           Physical Exam  General:  Obesity    Airway/Jaw/Neck:  Airway Findings: Mouth Opening: Normal Mallampati: II      Dental:  Dental Findings: In tact        Mental Status:  Mental Status Findings:  Cooperative, Alert and Oriented         Anesthesia Plan  Type of Anesthesia, risks & benefits discussed:  Anesthesia Type:  general  Patient's Preference:   Intra-op Monitoring Plan: standard ASA monitors  Intra-op Monitoring Plan Comments:   Post Op Pain Control Plan: multimodal analgesia  Post Op Pain Control Plan Comments:   Induction:   IV  Beta Blocker:         Informed Consent: Patient understands risks and agrees with Anesthesia plan.  Questions answered. Anesthesia consent signed with patient.  ASA Score: 2  emergent   Day of Surgery Review of History & Physical:    H&P update referred to the surgeon.         Ready For Surgery From Anesthesia Perspective.

## 2018-12-30 NOTE — OP NOTE
DATE OF PROCEDURE:  12/30/2018.    PREOPERATIVE DIAGNOSIS:  Right buttocks abscess.    POSTOPERATIVE DIAGNOSIS:  Right buttocks abscess.    PROCEDURE:  Drainage of right buttock abscess.    PROCEDURE IN DETAILS:  The patient was taken to the Operating Room, placed on   the table in the supine position.  After smooth induction with general   anesthesia, the patient was placed in the prone position.  The patient had a   tender pointing area in the lower aspect of the right buttocks.  An incision was   made into this area and carried down through the subcutaneous tissue.  There   was serosanguineous fluid encountered.  Digital exploration revealed the pockets   of bloody and some purulent fluid that was evacuated.  The area was probed with   an 18-gauge needle and no other larger collection was identified deep to this   area.  The wound was then irrigated with normal saline.  The Penrose drain was   then advanced into the cavity and sutured in place using 3-0 silk.  Sterile   dressing was applied.  Blood loss was 100 mL.  The patient tolerated the   procedure and left the Operating Room in stable condition.  The fluid was sent   for culture and sensitivity.      SEVERIANO/HUSSEIN  dd: 12/30/2018 10:56:56 (CST)  td: 12/30/2018 11:08:58 (CST)  Doc ID   #[id number]  Job ID #[id number]    CC:

## 2018-12-30 NOTE — TRANSFER OF CARE
"Anesthesia Transfer of Care Note    Patient: Ashlee Flores    Procedure(s) Performed: Procedure(s) (LRB):  IRRIGATION AND DRAINAGE (Right)    Patient location: PACU    Anesthesia Type: general    Transport from OR: Transported from OR on 2-3 L/min O2 by NC with adequate spontaneous ventilation    Post pain: adequate analgesia    Post assessment: no apparent anesthetic complications    Post vital signs: stable    Level of consciousness: awake and alert    Nausea/Vomiting: no nausea/vomiting    Complications: none    Transfer of care protocol was followed      Last vitals:   Visit Vitals  BP (!) 96/51 (BP Location: Right arm, Patient Position: Lying)   Pulse 70   Temp 36.9 °C (98.5 °F) (Oral)   Resp 18   Ht 5' 4" (1.626 m)   Wt 85.7 kg (189 lb 0 oz)   SpO2 100%   Breastfeeding? No   BMI 32.44 kg/m²     "

## 2018-12-30 NOTE — PLAN OF CARE
Discharge planner unable to assess patient for discharge needs; discharged in procedure at the time of visit. CM to follow up later

## 2018-12-30 NOTE — PLAN OF CARE
Problem: Adult Inpatient Plan of Care  Goal: Plan of Care Review  Outcome: Ongoing (interventions implemented as appropriate)  Pt VSS and afebrile, free of falls, trauma, and injury, positions self in bed, pain controlled with medication. Pt spouse at bedside. Pt ambulates to RR. Pt NPO after midnight. Pt prone in bed with dressing to rt buttock with scant yellow drainage. Pt in low locked bed,c all light in reach, safety precautions maintained, will continue to monitor.

## 2018-12-30 NOTE — BRIEF OP NOTE
[facility]  Brief Operative Note    SUMMARY     Surgery Date: 12/30/2018     Surgeon(s) and Role:     * Taj Sherman MD - Primary    Assisting Surgeon: None    Pre-op Diagnosis:  Abscess [L02.91]    Post-op Diagnosis:  Post-Op Diagnosis Codes:     * Abscess [L02.91]    Procedure(s) (LRB):  IRRIGATION AND DRAINAGE (Right)    Anesthesia: Choice    Description of Procedure: Right butt drainage    Description of the findings of the procedure: Fluid collection drained, Penrose placed    Estimated Blood Loss: * No values recorded between 12/30/2018 10:23 AM and 12/30/2018 10:52 AM *100cc         Specimens:   Specimen (12h ago, onward)    None

## 2018-12-30 NOTE — H&P
DATE OF ADMIT:  12/29/2018.    HISTORY OF PRESENT ILLNESS:  This 38-year-old black female presented to the   Emergency Room with complaints of right buttocks pain.  She underwent a   liposuction transfer in [place] around December 6th and following that had   developed swelling and pain in the right buttocks.  She was seen in the   Emergency Room and a CAT scan revealed an abscess in the right buttocks.  She is   admitted for treatment.    PAST MEDICAL HISTORY:  Significant for seizures and a pituitary tumor, which was   removed years ago.    ALLERGIES:  None known.    REVIEW OF SYSTEMS:  Positive for chills.  She denies any chest pain, shortness   of breath, cough or sputum production.    PHYSICAL EXAMINATION:  GENERAL:  In no acute distress.  HEAD, EARS, EYES, NOSE AND THROAT:  Within normal limits.  NECK:  Supple.  CHEST:  Clear.  HEART:  Rate and rhythm are regular.  ABDOMEN:  Soft and nontender.  There is a right buttock abscess, which is   exquisitely tender to touch with pointing in the lower aspect of the buttocks.  EXTREMITIES:  No edema.    IMPRESSION:  Right buttocks abscess secondary to liposuction transfer.    PLAN:  Drainage of abscess.      EBS/HN  dd: 12/30/2018 09:20:31 (CST)  td: 12/30/2018 10:13:15 (CST)  Doc ID   #[id number]  Job ID #[id number]    CC:

## 2018-12-31 PROCEDURE — 25500020 PHARM REV CODE 255: Performed by: SPECIALIST

## 2018-12-31 PROCEDURE — 11000001 HC ACUTE MED/SURG PRIVATE ROOM

## 2018-12-31 PROCEDURE — 63600175 PHARM REV CODE 636 W HCPCS: Performed by: ANESTHESIOLOGY

## 2018-12-31 PROCEDURE — 25000003 PHARM REV CODE 250: Performed by: SPECIALIST

## 2018-12-31 PROCEDURE — 63600175 PHARM REV CODE 636 W HCPCS: Performed by: SPECIALIST

## 2018-12-31 RX ORDER — HYDROCODONE BITARTRATE AND ACETAMINOPHEN 7.5; 325 MG/1; MG/1
1 TABLET ORAL EVERY 6 HOURS PRN
Status: DISCONTINUED | OUTPATIENT
Start: 2018-12-31 | End: 2019-01-02 | Stop reason: HOSPADM

## 2018-12-31 RX ADMIN — HYDROCODONE BITARTRATE AND ACETAMINOPHEN 1 TABLET: 7.5; 325 TABLET ORAL at 04:12

## 2018-12-31 RX ADMIN — PIPERACILLIN AND TAZOBACTAM 4.5 G: 4; .5 INJECTION, POWDER, LYOPHILIZED, FOR SOLUTION INTRAVENOUS; PARENTERAL at 04:12

## 2018-12-31 RX ADMIN — HYDROMORPHONE HYDROCHLORIDE 0.4 MG: 2 INJECTION INTRAMUSCULAR; INTRAVENOUS; SUBCUTANEOUS at 02:12

## 2018-12-31 RX ADMIN — IOHEXOL 15 ML: 350 INJECTION, SOLUTION INTRAVENOUS at 01:12

## 2018-12-31 RX ADMIN — IOHEXOL 75 ML: 350 INJECTION, SOLUTION INTRAVENOUS at 01:12

## 2018-12-31 RX ADMIN — HYDROMORPHONE HYDROCHLORIDE 0.4 MG: 2 INJECTION INTRAMUSCULAR; INTRAVENOUS; SUBCUTANEOUS at 08:12

## 2018-12-31 RX ADMIN — ONDANSETRON 4 MG: 2 INJECTION INTRAMUSCULAR; INTRAVENOUS at 06:12

## 2018-12-31 RX ADMIN — HYDROMORPHONE HYDROCHLORIDE 0.4 MG: 2 INJECTION INTRAMUSCULAR; INTRAVENOUS; SUBCUTANEOUS at 04:12

## 2018-12-31 RX ADMIN — PIPERACILLIN AND TAZOBACTAM 4.5 G: 4; .5 INJECTION, POWDER, LYOPHILIZED, FOR SOLUTION INTRAVENOUS; PARENTERAL at 08:12

## 2018-12-31 NOTE — PLAN OF CARE
Met with patient at bedside to complete discharge planning assessment. Patient is current with Dr Santos PCP at [facility]. Pharmacy of choice is [facility]. Patient's  will provide transportation home. Patient denied any anticipated DC needs      12/31/18 0915   Discharge Assessment   Assessment Type Discharge Planning Assessment   Confirmed/corrected address and phone number on facesheet? Yes   Assessment information obtained from? Patient   Communicated expected length of stay with patient/caregiver no   Prior to hospitilization cognitive status: Alert/Oriented   Prior to hospitalization functional status: Independent   Current cognitive status: Alert/Oriented   Current Functional Status: Independent   Lives With spouse;child(issac), dependent   Able to Return to Prior Arrangements yes   Is patient able to care for self after discharge? Yes   Patient's perception of discharge disposition home or selfcare   Readmission Within the Last 30 Days no previous admission in last 30 days   Patient currently being followed by outpatient case management? No   Patient currently receives any other outside agency services? No   Equipment Currently Used at Home none   Do you have any problems affording any of your prescribed medications? No   Is the patient taking medications as prescribed? yes   Does the patient have transportation home? Yes   Transportation Anticipated family or friend will provide   Does the patient receive services at the [facility]? No   Discharge Plan A Home with family   Patient/Family in Agreement with Plan yes

## 2018-12-31 NOTE — ANESTHESIA POSTPROCEDURE EVALUATION
"Anesthesia Post Evaluation    Patient: Ashlee Flores    Procedure(s) Performed: Procedure(s) (LRB):  IRRIGATION AND DRAINAGE (Right)    Final Anesthesia Type: general  Patient location during evaluation: PACU  Patient participation: Yes- Able to Participate  Level of consciousness: awake and alert  Post-procedure vital signs: reviewed and stable  Pain management: adequate  Airway patency: patent  PONV status at discharge: No PONV  Anesthetic complications: no      Cardiovascular status: blood pressure returned to baseline  Respiratory status: unassisted and room air  Hydration status: euvolemic  Follow-up not needed.        Visit Vitals  BP (!) 99/57 (BP Location: Right arm, Patient Position: Lying)   Pulse 69   Temp 36.6 °C (97.9 °F) (Oral)   Resp 18   Ht 5' 4" (1.626 m)   Wt 85.7 kg (189 lb 0 oz)   SpO2 98%   Breastfeeding? No   BMI 32.44 kg/m²       Pain/Lidya Score: Pain Rating Prior to Med Admin: 7 (12/30/2018 11:17 AM)  Pain Rating Post Med Admin: 3 (12/29/2018  4:44 PM)  Lidya Score: 9 (12/30/2018 11:30 AM)        "

## 2018-12-31 NOTE — PLAN OF CARE
Problem: Adult Inpatient Plan of Care  Goal: Plan of Care Review  Outcome: Ongoing (interventions implemented as appropriate)  Patient on RA. Sats 100%. Pt. in no distress, will continue to monitor.

## 2018-12-31 NOTE — PLAN OF CARE
Problem: Adult Inpatient Plan of Care  Goal: Plan of Care Review  Outcome: Ongoing (interventions implemented as appropriate)  Pt VSS and afebrile, positions self in bed, pain controlled with PO medication, family at bedside, ambulates to RR. Pt has 4x4 gauze with silk tape over rt buttock. Pt free of falls injury and trauma. Pt in low locked bed, call light in reach, safety precautions maintained, will continue to monitor.

## 2018-12-31 NOTE — PLAN OF CARE
Problem: Adult Inpatient Plan of Care  Goal: Plan of Care Review  Outcome: Ongoing (interventions implemented as appropriate)  Plan of Care reviewed with patient. Pt free from falls or injury. Pt lying in prone position watching television. Denies any c/o discomfort @ this time. Mother @ bedside. Dsg to Rt buttocks with moderate moist serosanguineous drainage noted. Dsg reinforced with 4x4's and tape. Tolerated well. Purposeful rounding done. Voiding without difficulty. Ambulates to bathroom independently. VSS. Safety maintained. Call light in reach.

## 2019-01-01 PROCEDURE — 63600175 PHARM REV CODE 636 W HCPCS: Performed by: SPECIALIST

## 2019-01-01 PROCEDURE — 11000001 HC ACUTE MED/SURG PRIVATE ROOM

## 2019-01-01 PROCEDURE — 25000003 PHARM REV CODE 250: Performed by: SPECIALIST

## 2019-01-01 RX ADMIN — HYDROCODONE BITARTRATE AND ACETAMINOPHEN 1 TABLET: 7.5; 325 TABLET ORAL at 04:01

## 2019-01-01 RX ADMIN — HYDROCODONE BITARTRATE AND ACETAMINOPHEN 1 TABLET: 7.5; 325 TABLET ORAL at 07:01

## 2019-01-01 RX ADMIN — PIPERACILLIN AND TAZOBACTAM 4.5 G: 4; .5 INJECTION, POWDER, LYOPHILIZED, FOR SOLUTION INTRAVENOUS; PARENTERAL at 12:01

## 2019-01-01 RX ADMIN — PIPERACILLIN AND TAZOBACTAM 4.5 G: 4; .5 INJECTION, POWDER, LYOPHILIZED, FOR SOLUTION INTRAVENOUS; PARENTERAL at 04:01

## 2019-01-01 RX ADMIN — PIPERACILLIN AND TAZOBACTAM 4.5 G: 4; .5 INJECTION, POWDER, LYOPHILIZED, FOR SOLUTION INTRAVENOUS; PARENTERAL at 08:01

## 2019-01-01 NOTE — NURSING
Patient lying in bed on her stomach watching television. Patient AA&O X4. Patient states that she is in some pain, pain medication given. Patient's wound on her right buttock redressed. Penrose drain removed today by Dr. Sherman. Safety discussed with patient. Purposeful rounding done with patient. Bed low, brakes engaged, call light within patient's reach and advised to call nurse with all assists. RN to continue to monitor patient this shift.

## 2019-01-01 NOTE — PROGRESS NOTES
POD#2  Doing OK.  Afeb  VSS  Penrose removed. Min drainage.  C&S pending  CT reviewed with Dr Tadeo. Probably oil/fluid pockets.  Probable discharge tomorrow.

## 2019-01-01 NOTE — PLAN OF CARE
Problem: Adult Inpatient Plan of Care  Goal: Plan of Care Review  Outcome: Ongoing (interventions implemented as appropriate)  Pt remains free from falls. Vitals were stable throughout the night on room air. Positions self independently. No complaints of pain or nausea. Bed in low position and call light within reach. Will continue to monitor.

## 2019-01-02 VITALS
HEART RATE: 80 BPM | BODY MASS INDEX: 32.27 KG/M2 | SYSTOLIC BLOOD PRESSURE: 109 MMHG | WEIGHT: 189 LBS | DIASTOLIC BLOOD PRESSURE: 62 MMHG | TEMPERATURE: 98 F | RESPIRATION RATE: 18 BRPM | OXYGEN SATURATION: 99 % | HEIGHT: 64 IN

## 2019-01-02 LAB
BACTERIA SPEC AEROBE CULT: NO GROWTH
BASOPHILS # BLD AUTO: 0.01 K/UL
BASOPHILS NFR BLD: 0.2 %
DIFFERENTIAL METHOD: ABNORMAL
EOSINOPHIL # BLD AUTO: 0.2 K/UL
EOSINOPHIL NFR BLD: 3.6 %
ERYTHROCYTE [DISTWIDTH] IN BLOOD BY AUTOMATED COUNT: 16.6 %
HCT VFR BLD AUTO: 24.2 %
HGB BLD-MCNC: 7.4 G/DL
LYMPHOCYTES # BLD AUTO: 1.6 K/UL
LYMPHOCYTES NFR BLD: 24.5 %
MCH RBC QN AUTO: 26 PG
MCHC RBC AUTO-ENTMCNC: 30.6 G/DL
MCV RBC AUTO: 85 FL
MONOCYTES # BLD AUTO: 0.4 K/UL
MONOCYTES NFR BLD: 6.4 %
NEUTROPHILS # BLD AUTO: 4.3 K/UL
NEUTROPHILS NFR BLD: 64.8 %
PLATELET # BLD AUTO: 400 K/UL
PMV BLD AUTO: 8.4 FL
RBC # BLD AUTO: 2.85 M/UL
WBC # BLD AUTO: 6.6 K/UL

## 2019-01-02 PROCEDURE — 85025 COMPLETE CBC W/AUTO DIFF WBC: CPT

## 2019-01-02 PROCEDURE — 94761 N-INVAS EAR/PLS OXIMETRY MLT: CPT

## 2019-01-02 PROCEDURE — 63600175 PHARM REV CODE 636 W HCPCS: Performed by: SPECIALIST

## 2019-01-02 PROCEDURE — 36415 COLL VENOUS BLD VENIPUNCTURE: CPT

## 2019-01-02 PROCEDURE — 25000003 PHARM REV CODE 250: Performed by: SPECIALIST

## 2019-01-02 RX ORDER — OXYCODONE AND ACETAMINOPHEN 7.5; 325 MG/1; MG/1
1 TABLET ORAL EVERY 6 HOURS PRN
Qty: 20 TABLET | Refills: 0 | Status: SHIPPED | OUTPATIENT
Start: 2019-01-02

## 2019-01-02 RX ORDER — CLINDAMYCIN HYDROCHLORIDE 150 MG/1
150 CAPSULE ORAL EVERY 8 HOURS
Qty: 15 CAPSULE | Refills: 0 | Status: SHIPPED | OUTPATIENT
Start: 2019-01-02

## 2019-01-02 RX ADMIN — HYDROCODONE BITARTRATE AND ACETAMINOPHEN 1 TABLET: 7.5; 325 TABLET ORAL at 12:01

## 2019-01-02 RX ADMIN — PIPERACILLIN AND TAZOBACTAM 4.5 G: 4; .5 INJECTION, POWDER, LYOPHILIZED, FOR SOLUTION INTRAVENOUS; PARENTERAL at 08:01

## 2019-01-02 RX ADMIN — PIPERACILLIN AND TAZOBACTAM 4.5 G: 4; .5 INJECTION, POWDER, LYOPHILIZED, FOR SOLUTION INTRAVENOUS; PARENTERAL at 12:01

## 2019-01-02 NOTE — NURSING
Report received. No acute distress observed. No c/o pain. Assessment per flow sheet. Will continue to monitor.

## 2019-01-02 NOTE — DISCHARGE SUMMARY
DATE OF ADMIT:  12/29/2018.    DATE OF DISCHARGE:  01/02/2019.    HISTORY:  This 38-year-old black female presented to the Emergency Room with   complaints of buttocks pain.  She had had a liposuction transfer in [place] on   December 6th and for the past two weeks, she was developing swelling and pain in   the buttocks.  She had been placed on antibiotics.  She was evaluated in the   Emergency Room and found to have a fluid collection in the right buttocks and   what appeared to be inflammatory changes.  She was admitted to the hospital and   placed on antibiotics.  She was taken to surgery on 12/30/2018 and underwent   drainage of the fluid collection.  The main portion of this fluid appeared to be   serous.  There was no gross purulent fluid encountered.  Cultures were taken.    Following this procedure, she had less pain.  She was maintained on antibiotics.    Cultures were negative at the time of discharge.  She had a repeat CT scan,   which showed other developing fluid collections, which were thought to be   secondary to the fat.  The liposuction transfer in probably represented oil   fluid type collection.  She was doing better at the time of discharge.  She was   discharged home in better condition on a regular diet, limited activity, her   normal medications plus Percocet for pain and clindamycin for several more days.    She will be followed up in the office in 1 week.      SEVERIANO/HN  dd: 01/02/2019 10:44:34 (CST)  td: 01/02/2019 12:32:44 (CST)  Doc ID   #[id number]  Job ID #[id number]    CC:

## 2019-01-02 NOTE — PLAN OF CARE
01/02/19 1051   Final Note   Assessment Type Final Discharge Note   Anticipated Discharge Disposition Home   What phone number can be called within the next 1-3 days to see how you are doing after discharge? [phone]   Discharge plans and expectations educations in teach back method with documentation complete? Yes   Right Care Referral Info   Post Acute Recommendation No Care

## 2019-01-02 NOTE — PLAN OF CARE
Problem: Infection  Goal: Infection Symptom Resolution    Intervention: Prevent or Manage Infection  Patient tolerated ABT without incidence. No acute distress/discomfort noted. No s/sx of adverse reaction noted. Patient remained afebrile. Will continue to monitor.

## 2019-01-03 LAB
BACTERIA BLD CULT: NORMAL
BACTERIA BLD CULT: NORMAL
BACTERIA SPEC ANAEROBE CULT: NORMAL

## 2019-01-29 LAB — FUNGUS SPEC CULT: NORMAL

## 2019-03-03 LAB
ACID FAST MOD KINY STN SPEC: NORMAL
MYCOBACTERIUM SPEC QL CULT: NORMAL

## 2020-02-04 ENCOUNTER — HOSPITAL ENCOUNTER (EMERGENCY)
Facility: OTHER | Age: 40
Discharge: HOME | End: 2020-02-04
Attending: EMERGENCY MEDICINE
Payer: MEDICAID

## 2020-02-04 VITALS
WEIGHT: 216 LBS | SYSTOLIC BLOOD PRESSURE: 125 MMHG | RESPIRATION RATE: 18 BRPM | HEART RATE: 84 BPM | TEMPERATURE: 99 F | DIASTOLIC BLOOD PRESSURE: 76 MMHG | OXYGEN SATURATION: 99 % | HEIGHT: 64 IN | BODY MASS INDEX: 36.88 KG/M2

## 2020-02-04 DIAGNOSIS — D64.9 ANEMIA, UNSPECIFIED TYPE: ICD-10-CM

## 2020-02-04 DIAGNOSIS — R55 SYNCOPE: ICD-10-CM

## 2020-02-04 DIAGNOSIS — J10.1 INFLUENZA A: Primary | ICD-10-CM

## 2020-02-04 LAB
B-HCG UR QL: NEGATIVE
CTP QC/QA: NORMAL
CTP QC/QA: YES
CTP QC/QA: YES
POC MOLECULAR INFLUENZA A AGN: NORMAL
POC MOLECULAR INFLUENZA A AGN: POSITIVE
POC MOLECULAR INFLUENZA B AGN: NEGATIVE
POC MOLECULAR INFLUENZA B AGN: NORMAL

## 2020-02-04 PROCEDURE — 96372 THER/PROPH/DIAG INJ SC/IM: CPT | Mod: 59

## 2020-02-04 PROCEDURE — 93010 EKG 12-LEAD: ICD-10-PCS | Mod: ,,, | Performed by: INTERNAL MEDICINE

## 2020-02-04 PROCEDURE — 93010 ELECTROCARDIOGRAM REPORT: CPT | Mod: ,,, | Performed by: INTERNAL MEDICINE

## 2020-02-04 PROCEDURE — 63600175 PHARM REV CODE 636 W HCPCS: Performed by: EMERGENCY MEDICINE

## 2020-02-04 PROCEDURE — 93005 ELECTROCARDIOGRAM TRACING: CPT

## 2020-02-04 PROCEDURE — 99284 EMERGENCY DEPT VISIT MOD MDM: CPT | Mod: 25

## 2020-02-04 PROCEDURE — 96374 THER/PROPH/DIAG INJ IV PUSH: CPT

## 2020-02-04 PROCEDURE — 81025 URINE PREGNANCY TEST: CPT | Performed by: EMERGENCY MEDICINE

## 2020-02-04 RX ORDER — AMOXICILLIN 500 MG/1
500 CAPSULE ORAL
COMMUNITY

## 2020-02-04 RX ORDER — KETOROLAC TROMETHAMINE 30 MG/ML
15 INJECTION, SOLUTION INTRAMUSCULAR; INTRAVENOUS
Status: COMPLETED | OUTPATIENT
Start: 2020-02-04 | End: 2020-02-04

## 2020-02-04 RX ADMIN — SODIUM CHLORIDE 1000 ML: 0.9 INJECTION, SOLUTION INTRAVENOUS at 12:02

## 2020-02-04 RX ADMIN — KETOROLAC TROMETHAMINE 15 MG: 30 INJECTION, SOLUTION INTRAMUSCULAR; INTRAVENOUS at 12:02

## 2020-02-04 RX ADMIN — PENICILLIN G BENZATHINE 1.2 MILLION UNITS: 1200000 INJECTION, SUSPENSION INTRAMUSCULAR at 12:02

## 2020-02-04 NOTE — ED TRIAGE NOTES
Patient states she was seen on Sunday and diagnosed with strep. She reports having generalized body aches and weakness since then. Denies fever since Sunday. Reports cough with productive sputum.

## 2020-02-04 NOTE — ED PROVIDER NOTES
Encounter Date: 2/4/2020    SCRIBE #1 NOTE: Sanchez LAMAR, am scribing for, and in the presence of, Dr. Rush.       History     Chief Complaint   Patient presents with    Loss of Consciousness     Pt diagnosed with strep on Sunday, (-) Flu swab. Pt c/o generalized aches, fever & dizziness. Pt reports that she felt weak & had a syncopal episode with diaphoresis 1 hour prior to arrival. Pt reports PMH of seizure, last seizure 2 yrs ago.     Time seen by provider: 12:25 PM    This is a 39 y.o. female who presents s/p LOC. Pt states she went to the ED 3 days ago and was diagnosed with strep and prescribed antibiotics. She states they tested her for the flu as well and that test was negative. Pt states today while walking her dog, she began to feel dizzy and weak. She states when she made it in the house, she began to sweat and feel increasingly dizzy. Pt states she does not remember passing out, she states she woke up on the floor and the dog was licking her. She reports a headache and neck pain. She denies back pain. Pt states she has a hx of seizures. She states she was taken off of her medicine and she has a follow up appointment in April. [facility] 1/16/2020.    The history is provided by the patient and medical records.     Review of patient's allergies indicates:  No Known Allergies  Past Medical History:   Diagnosis Date    Pituitary tumor     Seizures      Past Surgical History:   Procedure Laterality Date    INCISION AND DRAINAGE OF BUTTOCK Right 12/30/2018    Procedure: INCISION AND DRAINAGE, BUTTOCK ( ADD ON );  Surgeon: Taj Sherman MD;  Location: [facility];  Service: General;  Laterality: Right;  ( ADD ON )    LIPOSUCTION      TUMOR REMOVAL       History reviewed. No pertinent family history.  Social History     Tobacco Use    Smoking status: Former Smoker     Last attempt to quit: 2/4/2017     Years since quitting: 3.0    Smokeless tobacco: Never Used   Substance Use Topics    Alcohol  use: Yes     Comment: social    Drug use: Not on file     Review of Systems   Constitutional: Positive for diaphoresis. Negative for chills and fever.   HENT: Positive for sore throat. Negative for congestion and rhinorrhea.    Eyes: Negative for visual disturbance.   Respiratory: Negative for cough and shortness of breath.    Cardiovascular: Negative for chest pain.   Gastrointestinal: Negative for abdominal pain, diarrhea, nausea and vomiting.   Genitourinary: Negative for dysuria.   Musculoskeletal: Positive for neck pain. Negative for back pain.   Skin: Negative for rash.   Neurological: Positive for syncope and headaches. Negative for dizziness, weakness and light-headedness.   Psychiatric/Behavioral: Negative for confusion.       Physical Exam     Initial Vitals [02/04/20 1042]   BP Pulse Resp Temp SpO2   116/76 89 18 99.1 °F (37.3 °C) 100 %      MAP       --         Physical Exam    Nursing note and vitals reviewed.  Constitutional: She appears well-developed and well-nourished. She is not diaphoretic. No distress.   HENT:   Head: Normocephalic and atraumatic.   Mouth/Throat: Oropharynx is clear and moist and mucous membranes are normal.   No trismus. No stridor. No drooling.   Eyes: Conjunctivae and EOM are normal. Pupils are equal, round, and reactive to light. No scleral icterus.   Conjunctivae are pink, clear, and intact.    Neck: Normal range of motion. Neck supple.   Cardiovascular: Normal rate, regular rhythm, S1 normal, S2 normal and normal heart sounds. Exam reveals no gallop and no friction rub.    No murmur heard.  Pulmonary/Chest: Breath sounds normal. No respiratory distress. She has no wheezes. She has no rhonchi. She has no rales.   Lungs clear to auscultation bilaterally.    Abdominal: Soft. Bowel sounds are normal. There is no tenderness. There is no rebound and no guarding.   No audible bruits.    Musculoskeletal: Normal range of motion. She exhibits no edema or tenderness.   No lower  extremity edema.    Lymphadenopathy:     She has no cervical adenopathy.   Neurological: She is alert and oriented to person, place, and time.   Skin: Skin is warm and dry. Capillary refill takes less than 2 seconds. No rash noted. No pallor.   No palpable hematoma. No appreciable lacerations   Psychiatric: She has a normal mood and affect. Her behavior is normal. Judgment and thought content normal.         ED Course   Procedures  Labs Reviewed   POCT INFLUENZA A/B MOLECULAR - Abnormal; Notable for the following components:       Result Value    POC Molecular Influenza A Ag Positive (*)     All other components within normal limits   POCT URINE PREGNANCY   POCT INFLUENZA A/B MOLECULAR     EKG Readings: (Independently Interpreted)   Normal Sinus Rhythm at a rate of 88. No ST-T wave changes. Normal QRS duration.     ECG Results          EKG 12-lead (Final result)  Result time 02/04/20 17:56:12    Final result by Interface, Lab In [facility] (02/04/20 17:56:12)                 Narrative:    Test Reason : R55,    Vent. Rate : 088 BPM     Atrial Rate : 088 BPM     P-R Int : 126 ms          QRS Dur : 080 ms      QT Int : 336 ms       P-R-T Axes : 044 030 030 degrees     QTc Int : 406 ms    Normal sinus rhythm  Normal ECG    Confirmed by Fabio Schrader MD ([id number]) on 2/4/2020 5:56:04 PM    Referred By:             Confirmed By:Fabio Schrader MD                             EKG 12-LEAD (Final result)  Result time 02/06/20 12:37:52    Final result by Unknown User (02/06/20 12:37:52)                                Imaging Results    None          Medical Decision Making:   History:   Old Medical Records: I decided to obtain old medical records.  Independently Interpreted Test(s):   I have ordered and independently interpreted EKG Reading(s) - see prior notes  Clinical Tests:   Lab Tests: Ordered and Reviewed  Medical Tests: Ordered and Reviewed            Scribe Attestation:   Scribe #1: I performed the above scribed  service and the documentation accurately describes the services I performed. I attest to the accuracy of the note.    Attending Attestation:           Physician Attestation for Scribe:  Physician Attestation Statement for Scribe #1: I, Dr. Amezcua, reviewed documentation, as scribed by Sanchez Bhatia in my presence, and it is both accurate and complete.         Attending ED Notes:   Emergent evaluation a 39-year-old female with cough, cold, nasal congestion and runny nose. Patient states she had a recent diagnosis of strep pharyngitis.  Patient complains of a syncopal episode while walking her dog.  Patient is afebrile, nontoxic-appearing stable vital signs.  Patient is neurovascularly intact without focal neurologic deficits. Cranial nerves II-XII intact. Strength 5/5 throughout. Sensation intact to light touch throughout. Good finger to nose task ability. Negative pronator drift. Two point discrimination is intact throughout. Reflexes 2+ throughout. No papilledema.  No meningeal signs. PERRLA. EOMI.  Patient has no midline C-spine, T-spine or L-spine tenderness to palpation, crepitus or step-offs.  Influenza screen is positive for influenza A.  No acute findings on EKG.  The patient is extensively counseled on her diagnosis and treatment including all laboratory and physical exam findings.  The patient discharged good condition and directed follow-up with her PCP in the next 24-48 hours.                          Clinical Impression:     1. Influenza A    2. Syncope    3. Anemia, unspecified type                              Georges Amezcua MD  02/06/20 [id number]

## 2021-04-12 ENCOUNTER — PATIENT MESSAGE (OUTPATIENT)
Dept: RESEARCH | Facility: HOSPITAL | Age: 41
End: 2021-04-12

## 2021-09-12 ENCOUNTER — HOSPITAL ENCOUNTER (EMERGENCY)
Facility: OTHER | Age: 41
Discharge: HOME | End: 2021-09-12
Attending: EMERGENCY MEDICINE
Payer: COMMERCIAL

## 2021-09-12 VITALS
OXYGEN SATURATION: 97 % | SYSTOLIC BLOOD PRESSURE: 149 MMHG | RESPIRATION RATE: 16 BRPM | DIASTOLIC BLOOD PRESSURE: 75 MMHG | HEART RATE: 66 BPM

## 2021-09-12 DIAGNOSIS — Z51.89 VISIT FOR WOUND CHECK: Primary | ICD-10-CM

## 2021-09-12 PROCEDURE — 99282 EMERGENCY DEPT VISIT SF MDM: CPT

## 2023-09-28 ENCOUNTER — HOSPITAL ENCOUNTER (EMERGENCY)
Facility: HOSPITAL | Age: 43
Discharge: HOME | End: 2023-09-28
Attending: EMERGENCY MEDICINE
Payer: COMMERCIAL

## 2023-09-28 VITALS
BODY MASS INDEX: 34.15 KG/M2 | WEIGHT: 200 LBS | TEMPERATURE: 98 F | DIASTOLIC BLOOD PRESSURE: 86 MMHG | HEART RATE: 85 BPM | HEIGHT: 64 IN | RESPIRATION RATE: 20 BRPM | SYSTOLIC BLOOD PRESSURE: 128 MMHG | OXYGEN SATURATION: 100 %

## 2023-09-28 DIAGNOSIS — M25.569 KNEE PAIN: ICD-10-CM

## 2023-09-28 LAB
B-HCG UR QL: NEGATIVE
CTP QC/QA: YES

## 2023-09-28 PROCEDURE — 99284 EMERGENCY DEPT VISIT MOD MDM: CPT | Mod: 25,ER

## 2023-09-28 PROCEDURE — 81025 URINE PREGNANCY TEST: CPT | Mod: ER | Performed by: EMERGENCY MEDICINE

## 2023-09-28 RX ORDER — CYCLOBENZAPRINE HCL 10 MG
10 TABLET ORAL 3 TIMES DAILY PRN
Qty: 15 TABLET | Refills: 0 | Status: SHIPPED | OUTPATIENT
Start: 2023-09-28 | End: 2023-10-03

## 2023-09-28 RX ORDER — IBUPROFEN 600 MG/1
600 TABLET ORAL EVERY 6 HOURS PRN
Qty: 20 TABLET | Refills: 0 | Status: SHIPPED | OUTPATIENT
Start: 2023-09-28

## 2023-09-28 RX ORDER — ACETAMINOPHEN 500 MG
500 TABLET ORAL EVERY 6 HOURS PRN
Qty: 13 TABLET | Refills: 0 | Status: SHIPPED | OUTPATIENT
Start: 2023-09-28

## 2023-09-28 RX ORDER — LIDOCAINE 50 MG/G
1 PATCH TOPICAL DAILY
Qty: 5 PATCH | Refills: 0 | Status: SHIPPED | OUTPATIENT
Start: 2023-09-28

## 2023-09-28 NOTE — Clinical Note
"Ashlee Manningbrennen" Mark was seen and treated in our emergency department on 9/28/2023.  She may return to work on 09/30/2023.       If you have any questions or concerns, please don't hesitate to call.      Manny Malin MD"

## 2023-09-28 NOTE — DISCHARGE INSTRUCTIONS
Please wear your knee brace. I have a concern for ligamentous injury.    Thank you for coming to our Emergency Department today. It is important to remember that some problems or medical conditions are difficult to diagnose and may not be found or addressed during your Emergency Department visit.  These conditions often start with non-specific symptoms and can only be diagnosed on follow up visits with your primary care physician or specialist when the symptoms continue or change. Please remember that all medical conditions can change, and we cannot predict how you will be feeling tomorrow or the next day. Return to the ER with any questions/concerns, new/concerning symptoms, worsening or failure to improve.       Be sure to follow up with your primary care doctor and review all labs/imaging/tests that were performed during your ER visit with them. It is very common for us to identify non-emergent incidental findings which must be followed up with your primary care physician.  Some labs/imaging/tests may be outside of the normal range, and require non-emergent follow-up and/or further investigation/treatment/procedures/testing to help diagnose/exclude/prevent complications or other potentially serious medical conditions. Some abnormalities may not have been discussed or addressed during your ER visit. Some lab results may not return during your ER visit but can be accessible by downloading the free [facility] rose marie or by visiting [email or web address] . It is important for you to review all labs/imaging/tests which are outside of the normal range with your physician.    An ER visit does not replace a primary care visit, and many screening tests or follow-up tests cannot be ordered by an ER doctor or performed by the ER. Some tests may even require pre-approval.    If you do not have a primary care doctor, you may contact the one listed on your discharge paperwork or you may also call the [facility]  Appointment Desk at [phone] , or [address] [place] [address] at  [phone] to schedule an appointment, or establish care with a primary care doctor or even a specialist and to obtain information about local resources. It is important to your health that you have a primary care doctor.    Please take all medications as directed. We have done our best to select a medication for you that will treat your condition however, all medications may potentially have side-effects and it is impossible to predict which medications may give you side-effects or what those side-effects (if any) those medications may give you.  If you feel that you are having a negative effect or side-effect of any medication you should stop taking those medications immediately and seek medical attention. If you feel that you are having a life-threatening reaction call 911.        Do not drive, swim, climb to height, take a bath, operate heavy machinery, drink alcohol or take potentially sedating medications, sign any legal documents or make any important decisions for 24 hours if you have received any pain medications, sedatives or mood altering drugs during your ER visit or within 24 hours of taking them if they have been prescribed to you.     You can find additional resources for Dentists, hearing aids, durable medical equipment, low cost pharmacies and other resources at [email or web address]

## 2023-09-28 NOTE — ED PROVIDER NOTES
Encounter Date: 2023       History     Chief Complaint   Patient presents with    Knee Pain     Patient presents w/ a c/o of right pain for two days. Denies any injury or trauma. States that she was doing lunges when pain started shortly after. Ambulatory. 9/10. No OTC meds PTA.      42 yo female presenting 2/2 right knee pain that started when she was exercising and bent her knee.  No trauma to the knee.  No rashes lesions.  No fever.  Has been able to ambulate on it.  Pain worsened today.  No weakness.  No numbness or tingling.        Review of patient's allergies indicates:  No Known Allergies  Past Medical History:   Diagnosis Date    Pituitary tumor     Seizures      Past Surgical History:   Procedure Laterality Date    INCISION AND DRAINAGE OF BUTTOCK Right 2018    Procedure: INCISION AND DRAINAGE, BUTTOCK ( ADD ON );  Surgeon: Taj Sherman MD;  Location: [facility];  Service: General;  Laterality: Right;  ( ADD ON )    LIPOSUCTION      TUMOR REMOVAL       No family history on file.  Social History     Tobacco Use    Smoking status: Former     Current packs/day: 0.00     Types: Cigarettes     Quit date: 2017     Years since quittin.6    Smokeless tobacco: Never   Substance Use Topics    Alcohol use: Yes     Comment: social     Review of Systems   Constitutional:  Negative for fever.   HENT:  Negative for sore throat.    Respiratory:  Negative for shortness of breath.    Cardiovascular:  Negative for chest pain.   Gastrointestinal:  Negative for nausea.   Genitourinary:  Negative for dysuria.   Musculoskeletal:  Positive for arthralgias. Negative for back pain.   Skin:  Negative for rash.   Neurological:  Negative for weakness.   Hematological:  Does not bruise/bleed easily.       Physical Exam     Initial Vitals [23 1048]   BP Pulse Resp Temp SpO2   128/86 85 20 98 °F (36.7 °C) 100 %      MAP       --         Physical Exam    Nursing note and vitals reviewed.  Constitutional: She  appears well-developed and well-nourished.   HENT:   Head: Normocephalic and atraumatic.   Mouth/Throat: Oropharynx is clear and moist and mucous membranes are normal.   Eyes: Conjunctivae and EOM are normal. Pupils are equal, round, and reactive to light. Right conjunctiva is not injected. Left conjunctiva is not injected. No scleral icterus.   Neck: Neck supple.   Normal range of motion.   Full passive range of motion without pain.     Cardiovascular:  Normal rate, regular rhythm, S1 normal, S2 normal, normal heart sounds and normal pulses.     Exam reveals no gallop and no friction rub.       No murmur heard.  Pulses:       Radial pulses are 2+ on the right side and 2+ on the left side.   Pulmonary/Chest: Effort normal and breath sounds normal. No respiratory distress.   Abdominal: Abdomen is soft. She exhibits no distension. There is no abdominal tenderness.   Musculoskeletal:         General: No edema. Normal range of motion.      Cervical back: Full passive range of motion without pain, normal range of motion and neck supple.      Comments: Good active ROM of all extremities. No lower extremity edema or cyanosis.   Abnormality on anterior/posterior drawer sign on the right. No abnormality on varus/valgus testing or meniscal abnormality. Good distal pulses. No rashes/lesions or swelling     Neurological: No cranial nerve deficit. Gait normal.   A&Ox4, normal speech.   Skin: Skin is warm. No ecchymosis and no rash noted.   Psychiatric: She has a normal mood and affect. Thought content normal.         ED Course   Procedures  Labs Reviewed   POCT URINE PREGNANCY          Imaging Results              X-Ray Knee 3 View Right (In process)                      Medications - No data to display  Medical Decision Making  MEDICAL DECISION MAKING    INITIAL ASSESSMENT: 43 y.o. female who presents for right knee pain for 1 day.  There is no patellar ballotment. right knee with Positive anterior drawer test, Positive  posterior drawer test, Negative varus test Negative valgus test, Negative meniscal grind, distal pulse sensation and movement intact.   She is in no apparent distress and is afebrile.      LABS AND RADIOLOGY:  See below for radiologic analysis    MEDICATIONS ADMINISTERED: .medadminprose    DIFFERENTIAL DIAGNOSES:  Fracture, dislocation, effusion, septic joint, contusion    ACUTE DIAGNOSIS (ES):  right nkee pain    D/C PLANNING AND MEDICATIONS ORDRED:  Clinoril 150 mg p.o. b.i.d. ordered after reviewing renal status, ice pack, Ace bandage right knee, crutches for home use    FOLLOW UP:  With primary care provider    I discussed with the patient/family the diagnosis, treatment plan, indications for return to the emergency department, and for expected follow-up. The patient/family verbalized an understanding. The patient/family is asked if there are any questions or concerns. We discuss the case, until all issues are addressed to the patient/family's satisfaction. Patient/family understands and is agreeable to the plan.   Manny Malin    DISCLAIMER: This note was prepared with [employer] voice recognition transcription software. Garbled syntax, mangled pronouns, and other bizarre constructions may be attributed to that software system.      Amount and/or Complexity of Data Reviewed  Labs: ordered.     Details: UPT negative  Radiology: ordered and independent interpretation performed.     Details: No fracture    Risk  OTC drugs.  Prescription drug management.                               Clinical Impression:   Final diagnoses:  [M25.569] Knee pain        ED Disposition Condition    Discharge Stable          ED Prescriptions       Medication Sig Dispense Start Date End Date Auth. Provider    ibuprofen (ADVIL,MOTRIN) 600 MG tablet Take 1 tablet (600 mg total) by mouth every 6 (six) hours as needed for Pain. 20 tablet 9/28/2023 -- Manny Malin MD    acetaminophen (TYLENOL) 500 MG tablet Take 1 tablet (500 mg total) by  mouth every 6 (six) hours as needed. 13 tablet 9/28/2023 -- Manny Malin MD    cyclobenzaprine (FLEXERIL) 10 MG tablet Take 1 tablet (10 mg total) by mouth 3 (three) times daily as needed. 15 tablet 9/28/2023 10/3/2023 Manny Malin MD    LIDOcaine (LIDODERM) 5 % Place 1 patch onto the skin once daily. Remove & Discard patch within 12 hours or as directed by MD 5 patch 9/28/2023 -- Manny Malin MD          Follow-up Information       Follow up With Specialties Details Why Contact Info    Milo Pelletier MD Orthopedic Surgery Schedule an appointment as soon as possible for a visit in 2 days  [address] [facility] [address]  [phone]               Manny Malin MD  09/28/23 7879

## 2023-10-02 ENCOUNTER — OFFICE VISIT (OUTPATIENT)
Dept: ORTHOPEDICS | Facility: CLINIC | Age: 43
End: 2023-10-02
Payer: COMMERCIAL

## 2023-10-02 VITALS — WEIGHT: 199.94 LBS | BODY MASS INDEX: 34.13 KG/M2 | HEIGHT: 64 IN

## 2023-10-02 DIAGNOSIS — M25.569 KNEE PAIN: ICD-10-CM

## 2023-10-02 DIAGNOSIS — M76.51 PATELLAR TENDONITIS OF RIGHT KNEE: Primary | ICD-10-CM

## 2023-10-02 PROCEDURE — 1159F PR MEDICATION LIST DOCUMENTED IN MEDICAL RECORD: ICD-10-PCS | Mod: CPTII,S$GLB,, | Performed by: PHYSICIAN ASSISTANT

## 2023-10-02 PROCEDURE — 1159F MED LIST DOCD IN RCRD: CPT | Mod: CPTII,S$GLB,, | Performed by: PHYSICIAN ASSISTANT

## 2023-10-02 PROCEDURE — 99204 OFFICE O/P NEW MOD 45 MIN: CPT | Mod: S$GLB,,, | Performed by: PHYSICIAN ASSISTANT

## 2023-10-02 PROCEDURE — 99204 PR OFFICE/OUTPT VISIT, NEW, LEVL IV, 45-59 MIN: ICD-10-PCS | Mod: S$GLB,,, | Performed by: PHYSICIAN ASSISTANT

## 2023-10-02 PROCEDURE — 3008F PR BODY MASS INDEX (BMI) DOCUMENTED: ICD-10-PCS | Mod: CPTII,S$GLB,, | Performed by: PHYSICIAN ASSISTANT

## 2023-10-02 PROCEDURE — 99999 PR PBB SHADOW E&M-EST. PATIENT-LVL III: ICD-10-PCS | Mod: PBBFAC,,, | Performed by: PHYSICIAN ASSISTANT

## 2023-10-02 PROCEDURE — 3008F BODY MASS INDEX DOCD: CPT | Mod: CPTII,S$GLB,, | Performed by: PHYSICIAN ASSISTANT

## 2023-10-02 PROCEDURE — 99999 PR PBB SHADOW E&M-EST. PATIENT-LVL III: CPT | Mod: PBBFAC,,, | Performed by: PHYSICIAN ASSISTANT

## 2023-10-03 NOTE — PROGRESS NOTES
Subjective:     Patient ID: Ashlee Flores is a 43 y.o. female.    Chief Complaint: Pain of the Right Knee    HPI    Patient is a 43 year old female who presented to the ED with right knee pain . Patient stated that she was at the gym when she bent her knee and felt a pop in her knee cap area followed by some swelling and issues with placing weight. She reports that she has had issues with popping in her knee with bending but never to this extent.  She has treated with ibuprofen and it is doing better. She has continued issues with squatting and stairs. No locking catching. Some popping to knee cap but was presents prior to intermittently. No feeling of instability     Review of Systems   Constitutional: Negative for chills and fever.   Cardiovascular:  Negative for chest pain.   Respiratory:  Negative for cough and shortness of breath.    Skin:  Negative for color change, dry skin, itching, nail changes, poor wound healing and rash.   Musculoskeletal:         Right knee injury    Neurological:  Negative for dizziness.   Psychiatric/Behavioral:  Negative for altered mental status. The patient is not nervous/anxious.    All other systems reviewed and are negative.      Objective:       General    Constitutional: She is oriented to person, place, and time. She appears well-developed and well-nourished. No distress.   HENT:   Head: Atraumatic.   Eyes: Conjunctivae are normal.   Cardiovascular:  Normal rate.            Pulmonary/Chest: Effort normal.   Neurological: She is alert and oriented to person, place, and time.   Psychiatric: She has a normal mood and affect. Her behavior is normal.           Right Knee Exam     Tenderness   The patient is tender to palpation of the patellar tendon.    Range of Motion   The patient has normal right knee ROM.    Tests   Meniscus   Rudy:  Medial - negative Lateral - negative  Ligament Examination   Lachman: normal (-1 to 2mm)   PCL-Posterior Drawer: normal (0 to 2mm)     MCL -  Valgus: normal (0 to 2mm)  LCL - Varus: normal  Patella   Passive Patellar Tilt: neutral    Other   Sensation: normal    Muscle Strength   Right Lower Extremity   Quadriceps:  5/5   Hamstrin/5       Physical Exam  Constitutional:       General: She is not in acute distress.     Appearance: She is well-developed and well-nourished. She is not diaphoretic.   HENT:      Head: Atraumatic.   Eyes:      Conjunctiva/sclera: Conjunctivae normal.   Cardiovascular:      Rate and Rhythm: Normal rate.   Pulmonary:      Effort: Pulmonary effort is normal.   Musculoskeletal:      Right knee:      Instability Tests: Medial Rudy test negative and lateral Rudy test negative.   Neurological:      Mental Status: She is alert and oriented to person, place, and time.   Psychiatric:         Mood and Affect: Mood and affect normal.         Behavior: Behavior normal.         RADS:23 reviewed by myself  No fracture or dislocation.     Assessment:     Encounter Diagnoses   Name Primary?    Knee pain     Patellar tendonitis of right knee Yes       Plan:      Discussed plan with the patient. At this time will treat with rest ice NSAID x 2 weeks and patella stabilizing knee brace. Return to clinic as needed.

## 2025-07-21 ENCOUNTER — HOSPITAL ENCOUNTER (EMERGENCY)
Facility: HOSPITAL | Age: 45
Discharge: HOME | End: 2025-07-21
Attending: EMERGENCY MEDICINE
Payer: COMMERCIAL

## 2025-07-21 VITALS
DIASTOLIC BLOOD PRESSURE: 81 MMHG | WEIGHT: 210 LBS | SYSTOLIC BLOOD PRESSURE: 144 MMHG | OXYGEN SATURATION: 99 % | BODY MASS INDEX: 35.85 KG/M2 | HEART RATE: 85 BPM | TEMPERATURE: 98 F | RESPIRATION RATE: 20 BRPM | HEIGHT: 64 IN

## 2025-07-21 DIAGNOSIS — R05.1 ACUTE COUGH: Primary | ICD-10-CM

## 2025-07-21 DIAGNOSIS — J18.9 COMMUNITY ACQUIRED PNEUMONIA, UNSPECIFIED LATERALITY: ICD-10-CM

## 2025-07-21 DIAGNOSIS — R07.9 CHEST PAIN: ICD-10-CM

## 2025-07-21 LAB
B-HCG UR QL: NEGATIVE
CTP QC/QA: YES
CTP QC/QA: YES
INFLUENZA A ANTIGEN, POC: NEGATIVE
INFLUENZA B ANTIGEN, POC: NEGATIVE
SARS-COV-2 RDRP RESP QL NAA+PROBE: NEGATIVE

## 2025-07-21 PROCEDURE — 99284 EMERGENCY DEPT VISIT MOD MDM: CPT | Mod: 25,ER

## 2025-07-21 PROCEDURE — 81025 URINE PREGNANCY TEST: CPT | Mod: ER

## 2025-07-21 PROCEDURE — 87635 SARS-COV-2 COVID-19 AMP PRB: CPT | Mod: ER

## 2025-07-21 PROCEDURE — 87804 INFLUENZA ASSAY W/OPTIC: CPT | Mod: 59,ER

## 2025-07-21 RX ORDER — GUAIFENESIN 100 MG/5ML
100-200 LIQUID ORAL EVERY 4 HOURS PRN
Qty: 60 ML | Refills: 0 | Status: SHIPPED | OUTPATIENT
Start: 2025-07-21 | End: 2025-07-31

## 2025-07-21 RX ORDER — FLUTICASONE PROPIONATE 50 MCG
1 SPRAY, SUSPENSION (ML) NASAL 2 TIMES DAILY PRN
Qty: 15 G | Refills: 0 | Status: SHIPPED | OUTPATIENT
Start: 2025-07-21 | End: 2025-07-28

## 2025-07-21 RX ORDER — LORATADINE 10 MG/1
10 TABLET ORAL DAILY
Qty: 30 TABLET | Refills: 0 | Status: SHIPPED | OUTPATIENT
Start: 2025-07-21 | End: 2025-08-20

## 2025-07-21 RX ORDER — BENZONATATE 100 MG/1
100 CAPSULE ORAL 3 TIMES DAILY PRN
Qty: 15 CAPSULE | Refills: 0 | Status: SHIPPED | OUTPATIENT
Start: 2025-07-21 | End: 2025-07-26

## 2025-07-21 RX ORDER — AMOXICILLIN 500 MG/1
1000 CAPSULE ORAL EVERY 12 HOURS
Qty: 20 CAPSULE | Refills: 0 | Status: SHIPPED | OUTPATIENT
Start: 2025-07-21 | End: 2025-07-26

## 2025-07-21 NOTE — ED PROVIDER NOTES
Encounter Date: 7/21/2025       History     Chief Complaint   Patient presents with    URI     Pt presents w/ a c/o of URI sx (right otalgia, sore throat, and pleurisy) for two days.     Patient is a 45-year-old female with a past medical history of seizures who presents to the emergency department with complaints of cough, congestion and intermittent chest pain 3 days.  Patient has been taking over-the-counter [place] with a.  She states chest pain is worse with coughing.  She denies any known sick contacts.  Toes, shortness of breath nausea or vomiting.        Review of patient's allergies indicates:  No Known Allergies  Past Medical History:   Diagnosis Date    Pituitary tumor     Seizures      Past Surgical History:   Procedure Laterality Date    INCISION AND DRAINAGE OF BUTTOCK Right 12/30/2018    Procedure: INCISION AND DRAINAGE, BUTTOCK ( ADD ON );  Surgeon: Taj Sherman MD;  Location: [facility];  Service: General;  Laterality: Right;  ( ADD ON )    LIPOSUCTION      TUMOR REMOVAL       No family history on file.  Social History[1]  Review of Systems   Constitutional:  Negative for chills and fever.   HENT:  Positive for congestion.    Respiratory:  Positive for cough and chest tightness. Negative for shortness of breath.    Cardiovascular:  Negative for chest pain and leg swelling.   Gastrointestinal:  Negative for abdominal pain and nausea.   Neurological:  Negative for dizziness and weakness.       Physical Exam     Initial Vitals [07/21/25 1053]   BP Pulse Resp Temp SpO2   (!) 144/81 85 20 98.4 °F (36.9 °C) 99 %      MAP       --         Physical Exam    Nursing note and vitals reviewed.  Constitutional: She appears well-developed and well-nourished. She is not diaphoretic. No distress.   HENT:   Head: Normocephalic and atraumatic.   Right Ear: External ear normal.   Left Ear: External ear normal. Mouth/Throat: Oropharynx is clear and moist. No oropharyngeal exudate.   Eyes: Conjunctivae and  EOM are normal.   Neck: No tracheal deviation present. No JVD present.   Normal range of motion.  Cardiovascular:  Normal rate and regular rhythm.           Pulmonary/Chest: No stridor. No respiratory distress. She has no wheezes. She has no rhonchi. She has no rales.   Abdominal: She exhibits no distension. There is no abdominal tenderness. There is no rebound and no guarding.   Musculoskeletal:      Cervical back: Normal range of motion.     Neurological: She is alert and oriented to person, place, and time.   Skin: No rash noted. No erythema.   Psychiatric: She has a normal mood and affect.         ED Course   Procedures  Labs Reviewed   POCT URINE PREGNANCY       Result Value    POC Preg Test, Ur Negative       Acceptable Yes     SARS-COV-2 RDRP GENE    POC Rapid COVID Negative       Acceptable Yes      Narrative:     This test utilizes isothermal nucleic acid amplification technology to detect the SARS-CoV-2 RdRp nucleic acid segment. The analytical sensitivity (limit of detection) is 500 copies/swab.     A POSITIVE result is indicative of the presence of SARS-CoV-2 RNA; clinical correlation with patient history and other diagnostic information is necessary to determine patient infection status.    A NEGATIVE result means that SARS-CoV-2 nucleic acids are not present above the limit of detection. A NEGATIVE result should be treated as presumptive. It does not rule out the possibility of COVID-19 and should not be the sole basis for treatment decisions. If COVID-19 is strongly suspected based on clinical and exposure history, re-testing using an alternate molecular assay should be considered.     Commercial kits are provided by [employer].       POCT INFLUENZA A/B MOLECULAR   POCT RAPID INFLUENZA A/B    Influenza B Ag negative      Inflenza A Ag negative            Imaging Results              X-Ray Chest PA And Lateral (Final result)  Result time 07/21/25 11:28:48       Final result by Darren Paul MD (07/21/25 11:28:48)                   Impression:      Question of a small linear opacities in the right upper lobe.  Follow-up study recommended      Electronically signed by: Darren Paul MD  Date:    07/21/2025  Time:    11:28               Narrative:    EXAMINATION:  XR CHEST PA AND LATERAL    CLINICAL HISTORY:  Chest pain, unspecified    TECHNIQUE:  PA and lateral views of the chest were performed.    COMPARISON:  None    FINDINGS:  Heart size normal.  Minimal linear densities probably vascular shadows or small subsegmental atelectatic changes in the right upper lobe.  A small area of airway disease however cannot be ruled out.  Otherwise the lungs are clear.  No pleural effusion                                       Medications - No data to display  Medical Decision Making  Patient is a well-appearing 45 y.o. female. Tolerating PO, non-toxic appearing. The patient remained comfortable and stable during their visit in the ED.  Details of ED course documented in ED workup.     Differential Diagnosis is considered, but is not limited to: COVID, Flu, Strep throat, Viral URI, PTA, RPA, pneumonia, acute otitis media, otitis externa, mastoiditis, sinusitis, viral gastroenteritis, measles, roseola.  Also considered but less likely:  PTA/RPA: no hot potato voice, no uvular deviation, no pain with passive neck flexion.  Esophageal rupture: No history of dysphagia.  Unlikely deep space infection/Fredrick's, no submandibular or sublingual erythema or swelling.  Low suspicion for CNS infection/meningitis: no pain with passive neck flexion, no neck rigidity/stiffness, no neck tenderness, negative Brudzinski.  Unlikely EBV as no splenomegaly, no LUQ abdominal tenderness or pain.    COVID test Negative. and Influenza test Negative.  Chest x-ray with questionable linear opacity in the right upper lobe.  Given patient's symptoms, we will treat with the amoxicillin x5 days.    All historical,  clinical, and laboratory findings reviewed. Patient with constellation of symptoms most consistent with cap. There are no concerning features on physical exam to suggest an emergent or life threatening condition or an invasive bacterial infection, including, but not limited to the conditions noted above. No further intervention is indicated at this time. The patient is at low risk for an emergent/life threatening medical condition at this time, and I am of the belief that that it is safe to discharge the patient from the emergency department.     Patient/family instructed to follow up with PCP/Pediatrician in 1-2 days for recheck of today's complaints. Patient/family has been counseled regarding the need for follow-up as well as the indications to return to the emergency room should new, worsening, continued or worrisome developments. Discharge and follow-up instructions discussed with the patient/family who expressed understanding and willingness to comply with recommendations. Patient discharged from the emergency department in stable condition, in no acute distress.  I discussed with the patient/family the diagnosis, treatment plan, indications for return to the emergency department, and for expected follow-up. The patient/family verbalized an understanding. The patient/family is asked if there are any questions or concerns. We discuss the case, until all issues are addressed to the patient/family's satisfaction. Patient/family understands and is agreeable to the plan.       Amount and/or Complexity of Data Reviewed  Labs: ordered.  Radiology: ordered.    Risk  OTC drugs.  Prescription drug management.                                          Clinical Impression:  Final diagnoses:  [R07.9] Chest pain  [R05.1] Acute cough (Primary)  [J18.9] Community acquired pneumonia, unspecified laterality          ED Disposition Condition    Discharge Stable          ED Prescriptions       Medication Sig Dispense Start Date  End Date Auth. Provider    amoxicillin (AMOXIL) 500 MG capsule Take 2 capsules (1,000 mg total) by mouth every 12 (twelve) hours. for 5 days 20 capsule 2025 Denise Best PA-C    benzonatate (TESSALON) 100 MG capsule Take 1 capsule (100 mg total) by mouth 3 (three) times daily as needed. 15 capsule 2025 Denise Best PA-C    loratadine (CLARITIN) 10 mg tablet Take 1 tablet (10 mg total) by mouth once daily. 30 tablet 2025 Denise Best PA-C    fluticasone propionate (FLONASE) 50 mcg/actuation nasal spray 1 spray (50 mcg total) by Each Nostril route 2 (two) times daily as needed for Rhinitis. 15 g 2025 Denise Best PA-C    guaiFENesin 100 mg/5 ml (ROBITUSSIN) 100 mg/5 mL syrup Take 5-10 mLs (100-200 mg total) by mouth every 4 (four) hours as needed for Cough. 60 mL 2025 Denise Best PA-C          Follow-up Information       Follow up With Specialties Details Why Contact [facility], Psychiatry, Family Medicine   [address] [facility] [address]  [phone]      [facility] ED Emergency Medicine Go to  for new or worsening symptoms [address] [facility] [address] [phone]                   [1]   Social History  Tobacco Use    Smoking status: Former     Current packs/day: 0.00     Types: Cigarettes     Quit date: 2017     Years since quittin.4    Smokeless tobacco: Never   Substance Use Topics    Alcohol use: Yes     Comment: Denise Calles PA-C  [phone]

## 2025-07-21 NOTE — Clinical Note
"Ashlee Manningbrennen" Mark was seen and treated in our emergency department on 7/21/2025.  She may return to work on 07/24/2025.       If you have any questions or concerns, please don't hesitate to call.      Denise Best PA-C"

## 2025-07-21 NOTE — DISCHARGE INSTRUCTIONS
Thank you for coming to our Emergency Department today. It is important to remember that some problems or medical conditions are difficult to diagnose and may not be found or addressed during your Emergency Department visit.  These conditions often start with non-specific symptoms and can only be diagnosed on follow up visits with your primary care physician or specialist when the symptoms continue or change. Please remember that all medical conditions can change, and we cannot predict how you will be feeling tomorrow or the next day. Return to the ER with any questions/concerns, new/concerning symptoms, worsening or failure to improve.       Be sure to follow up with your primary care doctor and review all labs/imaging/tests that were performed during your ER visit with them. It is very common for us to identify non-emergent incidental findings which must be followed up with your primary care physician.  Some labs/imaging/tests may be outside of the normal range, and require non-emergent follow-up and/or further investigation/treatment/procedures/testing to help diagnose/exclude/prevent complications or other potentially serious medical conditions. Some abnormalities may not have been discussed or addressed during your ER visit.     An ER visit does not replace a primary care visit, and many screening tests or follow-up tests cannot be ordered by an ER doctor or performed by the ER. Some tests may even require pre-approval.    If you do not have a primary care doctor, you may contact the one listed on your discharge paperwork or you may also call the [facility] Appointment Desk at [phone] , or [address] [place] [address] at  [phone] to schedule an appointment, or establish care with a primary care doctor or even a specialist and to obtain information about local resources. It is important to your health that you have a primary care doctor.    Please take all medications as directed. We have done our best to select  a medication for you that will treat your condition however, all medications may potentially have side-effects and it is impossible to predict which medications may give you side-effects or what those side-effects (if any) those medications may give you.  If you feel that you are having a negative effect or side-effect of any medication you should stop taking those medications immediately and seek medical attention. If you feel that you are having a life-threatening reaction call 911.        Do not drive, swim, climb to height, take a bath, operate heavy machinery, drink alcohol or take potentially sedating medications, sign any legal documents or make any important decisions for 24 hours if you have received any pain medications, sedatives or mood altering drugs during your ER visit or within 24 hours of taking them if they have been prescribed to you.     You can find additional resources for Dentists, hearing aids, durable medical equipment, low cost pharmacies and other resources at [email or web address]

## (undated) DEVICE — TUBE LUKI ASP COLL 6 1/4

## (undated) DEVICE — SEE MEDLINE ITEM 146347

## (undated) DEVICE — GAUZE SPONGE 4'X4 12 PLY